# Patient Record
Sex: FEMALE | Race: OTHER | HISPANIC OR LATINO
[De-identification: names, ages, dates, MRNs, and addresses within clinical notes are randomized per-mention and may not be internally consistent; named-entity substitution may affect disease eponyms.]

---

## 2020-01-15 ENCOUNTER — APPOINTMENT (OUTPATIENT)
Dept: RHEUMATOLOGY | Facility: CLINIC | Age: 55
End: 2020-01-15
Payer: COMMERCIAL

## 2020-01-15 VITALS
WEIGHT: 150 LBS | SYSTOLIC BLOOD PRESSURE: 136 MMHG | OXYGEN SATURATION: 98 % | BODY MASS INDEX: 27.6 KG/M2 | RESPIRATION RATE: 17 BRPM | HEIGHT: 62 IN | HEART RATE: 85 BPM | DIASTOLIC BLOOD PRESSURE: 68 MMHG | TEMPERATURE: 99.3 F

## 2020-01-15 DIAGNOSIS — Z87.09 PERSONAL HISTORY OF OTHER DISEASES OF THE RESPIRATORY SYSTEM: ICD-10-CM

## 2020-01-15 DIAGNOSIS — Z78.9 OTHER SPECIFIED HEALTH STATUS: ICD-10-CM

## 2020-01-15 PROCEDURE — 99205 OFFICE O/P NEW HI 60 MIN: CPT

## 2020-01-15 NOTE — HISTORY OF PRESENT ILLNESS
[FreeTextEntry1] : 54 year old female with PMH as listed below presents today for an initial evaluation/ establish care for RA. \par \par Was previously following Dr. Ty for RA. Had symptoms of polyarthritis x 3 years. Treated with MTX- 10mg weekly. x 3 years with improvement in symptoms. Last seen by Dr. Ty 3 months ago . Last dose of MTX- 3 months ago.  Currently not taking any medications for pain. No longer following Dr. Ty as he does not accept her insurance. \par \par Today reports to have polyarthritis to BL hands. Denies significant swelling. Pain is dull/aching. Currently 5/10. Pain is constant, worse in the morning. Has morning stiffness 10-20 mins daily. \par \par denies fever, chills, weight loss, weight gain, fatigue, malaise, denies dry eye,eye pain, eye redness, vision changes, dry mouth, oral ulcers, nasal congestion, difficulty swallowing,  denies ear pain, hearing changes, denies chest pain, chest palpitations, denies sob, denies nausea, vomiting, abdominal pain, diarrhea, constipation, blood in stool, denies dysuria, blood in the urine, denies rashes, photosensitivity, hair loss, skin thickening, denies blood clots,  raynauds\par

## 2020-01-15 NOTE — ASSESSMENT
[FreeTextEntry1] : 54 year old female presents today to establish care for RA. Was dx 3 years ago. Previously following Dr. Ty. Treated with MTX- 10mg weekly x 3 years with improvement in symptoms.  Last dose of MTX- 3 months ago.  Currently not taking any medications for pain.Today with polyarthritis to BL hands. Will do further w/o as below for inflammatory arthritis/RA. \par \par - labs as below\par - xray of BL hands, BL feet, baseline CXR\par - prednisone 5mg daily until f/u (reviewed risk and benefits of medications)\par -Tylenol prn for pain (reviewed risk and benefits of medications)\par -OTC topical analgesics\par -will plan to start pt on DMARDS, likely MTX  after reviewing results\par \par Discussed treatment plan with the patient. The patient was given the opportunity to ask questions and all questions were answered to their satisfaction.\par \par

## 2020-01-15 NOTE — PHYSICAL EXAM
[General Appearance - Alert] : alert [General Appearance - In No Acute Distress] : in no acute distress [General Appearance - Well Nourished] : well nourished [General Appearance - Well Developed] : well developed [Sclera] : the sclera and conjunctiva were normal [PERRL With Normal Accommodation] : pupils were equal in size, round, and reactive to light [Outer Ear] : the ears and nose were normal in appearance [Examination Of The Oral Cavity] : the lips and gums were normal [Oropharynx] : the oropharynx was normal [Neck Appearance] : the appearance of the neck was normal [Heart Rate And Rhythm] : heart rate was normal and rhythm regular [Auscultation Breath Sounds / Voice Sounds] : lungs were clear to auscultation bilaterally [Heart Sounds] : normal S1 and S2 [Heart Sounds Gallop] : no gallops [Murmurs] : no murmurs [Heart Sounds Pericardial Friction Rub] : no pericardial rub [Bowel Sounds] : normal bowel sounds [Abdomen Soft] : soft [Abdomen Tenderness] : non-tender [] : no rash [Skin Lesions] : no skin lesions [No Focal Deficits] : no focal deficits [Oriented To Time, Place, And Person] : oriented to person, place, and time [No CVA Tenderness] : no ~M costovertebral angle tenderness [No Spinal Tenderness] : no spinal tenderness [Abnormal Walk] : normal gait [Nail Clubbing] : no clubbing  or cyanosis of the fingernails [Involuntary Movements] : no involuntary movements were seen [Musculoskeletal - Swelling] : no joint swelling seen [Motor Tone] : muscle strength and tone were normal [FreeTextEntry1] : +tenderness and puffiness to BL hands- PIPs, MCPS. No active synovitis

## 2020-01-21 ENCOUNTER — FORM ENCOUNTER (OUTPATIENT)
Age: 55
End: 2020-01-21

## 2020-01-21 LAB
25(OH)D3 SERPL-MCNC: 17 NG/ML
ALBUMIN MFR SERPL ELPH: 52.6 %
ALBUMIN SERPL ELPH-MCNC: 4.3 G/DL
ALBUMIN SERPL-MCNC: 4.1 G/DL
ALBUMIN/GLOB SERPL: 1.1 RATIO
ALP BLD-CCNC: 95 U/L
ALPHA1 GLOB MFR SERPL ELPH: 3.4 %
ALPHA1 GLOB SERPL ELPH-MCNC: 0.3 G/DL
ALPHA2 GLOB MFR SERPL ELPH: 9.4 %
ALPHA2 GLOB SERPL ELPH-MCNC: 0.7 G/DL
ALT SERPL-CCNC: 22 U/L
ANION GAP SERPL CALC-SCNC: 14 MMOL/L
AST SERPL-CCNC: 21 U/L
B-GLOBULIN MFR SERPL ELPH: 14.8 %
B-GLOBULIN SERPL ELPH-MCNC: 1.2 G/DL
BASOPHILS # BLD AUTO: 0.08 K/UL
BASOPHILS NFR BLD AUTO: 1 %
BILIRUB SERPL-MCNC: 0.3 MG/DL
BUN SERPL-MCNC: 8 MG/DL
CALCIUM SERPL-MCNC: 9.5 MG/DL
CCP AB SER IA-ACNC: >250 UNITS
CHLORIDE SERPL-SCNC: 104 MMOL/L
CK SERPL-CCNC: 223 U/L
CO2 SERPL-SCNC: 22 MMOL/L
CREAT SERPL-MCNC: 0.73 MG/DL
CREAT SPEC-SCNC: 86 MG/DL
CREAT/PROT UR: 0.1 RATIO
CRP SERPL-MCNC: 0.15 MG/DL
EOSINOPHIL # BLD AUTO: 0.5 K/UL
EOSINOPHIL NFR BLD AUTO: 6.5 %
ERYTHROCYTE [SEDIMENTATION RATE] IN BLOOD BY WESTERGREN METHOD: 102 MM/HR
GAMMA GLOB FLD ELPH-MCNC: 1.5 G/DL
GAMMA GLOB MFR SERPL ELPH: 19.8 %
GLUCOSE SERPL-MCNC: 89 MG/DL
HAV IGM SER QL: NONREACTIVE
HBV CORE IGG+IGM SER QL: NONREACTIVE
HBV CORE IGM SER QL: NONREACTIVE
HBV E AB SER QL: POSITIVE
HBV E AG SER QL: NEGATIVE
HBV SURFACE AB SER QL: NONREACTIVE
HBV SURFACE AG SER QL: NONREACTIVE
HCT VFR BLD CALC: 40.9 %
HCV AB SER QL: NONREACTIVE
HCV S/CO RATIO: 0.16 S/CO
HEPATITIS A IGG ANTIBODY: REACTIVE
HEPATITIS A IGG ANTIBODY: REACTIVE
HGB BLD-MCNC: 13.1 G/DL
IMM GRANULOCYTES NFR BLD AUTO: 0.1 %
INTERPRETATION SERPL IEP-IMP: NORMAL
LYMPHOCYTES # BLD AUTO: 2.66 K/UL
LYMPHOCYTES NFR BLD AUTO: 34.4 %
M PROTEIN MFR SERPL ELPH: NORMAL
M PROTEIN SPEC IFE-MCNC: NORMAL
M TB IFN-G BLD-IMP: POSITIVE
MAN DIFF?: NORMAL
MCHC RBC-ENTMCNC: 30.8 PG
MCHC RBC-ENTMCNC: 32 GM/DL
MCV RBC AUTO: 96 FL
MONOCLON BAND OBS SERPL: NORMAL
MONOCYTES # BLD AUTO: 0.47 K/UL
MONOCYTES NFR BLD AUTO: 6.1 %
NEUTROPHILS # BLD AUTO: 4.01 K/UL
NEUTROPHILS NFR BLD AUTO: 51.9 %
PLATELET # BLD AUTO: 264 K/UL
POTASSIUM SERPL-SCNC: 4 MMOL/L
PROT SERPL-MCNC: 7.8 G/DL
PROT UR-MCNC: 7 MG/DL
QUANTIFERON TB PLUS MITOGEN MINUS NIL: 8.23 IU/ML
QUANTIFERON TB PLUS NIL: 0.04 IU/ML
QUANTIFERON TB PLUS TB1 MINUS NIL: 0.46 IU/ML
QUANTIFERON TB PLUS TB2 MINUS NIL: 0.46 IU/ML
RBC # BLD: 4.26 M/UL
RBC # FLD: 12.5 %
RF+CCP IGG SER-IMP: ABNORMAL
RHEUMATOID FACT SER QL: >650 IU/ML
SODIUM SERPL-SCNC: 140 MMOL/L
TSH SERPL-ACNC: 1.74 UIU/ML
WBC # FLD AUTO: 7.73 K/UL

## 2020-01-22 ENCOUNTER — APPOINTMENT (OUTPATIENT)
Dept: RADIOLOGY | Facility: CLINIC | Age: 55
End: 2020-01-22
Payer: COMMERCIAL

## 2020-01-22 ENCOUNTER — OUTPATIENT (OUTPATIENT)
Dept: OUTPATIENT SERVICES | Facility: HOSPITAL | Age: 55
LOS: 1 days | End: 2020-01-22
Payer: COMMERCIAL

## 2020-01-22 DIAGNOSIS — Z87.39 PERSONAL HISTORY OF OTHER DISEASES OF THE MUSCULOSKELETAL SYSTEM AND CONNECTIVE TISSUE: ICD-10-CM

## 2020-01-22 PROCEDURE — 73630 X-RAY EXAM OF FOOT: CPT

## 2020-01-22 PROCEDURE — 71046 X-RAY EXAM CHEST 2 VIEWS: CPT | Mod: 26

## 2020-01-22 PROCEDURE — 73600 X-RAY EXAM OF ANKLE: CPT | Mod: 26,50

## 2020-01-22 PROCEDURE — 73130 X-RAY EXAM OF HAND: CPT | Mod: 26,50

## 2020-01-22 PROCEDURE — 73630 X-RAY EXAM OF FOOT: CPT | Mod: 26,50

## 2020-01-22 PROCEDURE — 73130 X-RAY EXAM OF HAND: CPT

## 2020-01-22 PROCEDURE — 71046 X-RAY EXAM CHEST 2 VIEWS: CPT

## 2020-01-22 PROCEDURE — 73600 X-RAY EXAM OF ANKLE: CPT

## 2020-01-31 ENCOUNTER — APPOINTMENT (OUTPATIENT)
Dept: RHEUMATOLOGY | Facility: CLINIC | Age: 55
End: 2020-01-31
Payer: COMMERCIAL

## 2020-01-31 VITALS
HEART RATE: 86 BPM | SYSTOLIC BLOOD PRESSURE: 110 MMHG | OXYGEN SATURATION: 98 % | HEIGHT: 62 IN | TEMPERATURE: 98.7 F | BODY MASS INDEX: 28.52 KG/M2 | WEIGHT: 155 LBS | RESPIRATION RATE: 17 BRPM | DIASTOLIC BLOOD PRESSURE: 60 MMHG

## 2020-01-31 DIAGNOSIS — R76.12 NONSPECIFIC REACTION TO CELL MEDIATED IMMUNITY MEASUREMENT OF GAMMA INTERFERON ANTIGEN RESPONSE W/OUT ACTIVE TUBERCULOSIS: ICD-10-CM

## 2020-01-31 PROCEDURE — 99215 OFFICE O/P EST HI 40 MIN: CPT

## 2020-01-31 NOTE — HISTORY OF PRESENT ILLNESS
[FreeTextEntry1] : Pt presenting today for a f.u visit. Now asymptomatic. s/p trail with prednisone 5mg daily with significant improvement in symptoms. Has morning stiffness- 30 mins daily. Denies pain/swelling to any joint. \par Labs and imaging reviewed with pt. Found to have elevated inflammatory markers, RF, CCP. +Quantiferon, +Hep Be AB, vitamin d: 17. \par Xray BL hands/ BL ankles/feet: unremarkable\par baseline CXR: unremarkable

## 2020-01-31 NOTE — PHYSICAL EXAM
[General Appearance - Alert] : alert [General Appearance - In No Acute Distress] : in no acute distress [General Appearance - Well Developed] : well developed [General Appearance - Well Nourished] : well nourished [Sclera] : the sclera and conjunctiva were normal [Outer Ear] : the ears and nose were normal in appearance [Examination Of The Oral Cavity] : the lips and gums were normal [PERRL With Normal Accommodation] : pupils were equal in size, round, and reactive to light [Neck Appearance] : the appearance of the neck was normal [Oropharynx] : the oropharynx was normal [Auscultation Breath Sounds / Voice Sounds] : lungs were clear to auscultation bilaterally [Heart Sounds] : normal S1 and S2 [Heart Rate And Rhythm] : heart rate was normal and rhythm regular [Heart Sounds Gallop] : no gallops [Murmurs] : no murmurs [Heart Sounds Pericardial Friction Rub] : no pericardial rub [Abdomen Soft] : soft [Bowel Sounds] : normal bowel sounds [Abdomen Tenderness] : non-tender [No CVA Tenderness] : no ~M costovertebral angle tenderness [No Spinal Tenderness] : no spinal tenderness [Abnormal Walk] : normal gait [Nail Clubbing] : no clubbing  or cyanosis of the fingernails [Motor Tone] : muscle strength and tone were normal [Involuntary Movements] : no involuntary movements were seen [Musculoskeletal - Swelling] : no joint swelling seen [] : no rash [Skin Lesions] : no skin lesions [No Focal Deficits] : no focal deficits [Oriented To Time, Place, And Person] : oriented to person, place, and time [FreeTextEntry1] : +mild tenderness and puffiness to BL hands- PIPs, MCPS- improved from prior

## 2020-01-31 NOTE — ASSESSMENT
[FreeTextEntry1] : 54 year old female presents today for f.u visit for RA. Has hx of polyarthritis to BL hands. Previously following Dr. Ty. Treated with MTX- 10mg weekly x 3 years with improvement in symptoms.  Last dose of MTX- 3 months ago.  Now s/p trail with prednisone 5mg daily with significant improvement in symptoms. \par Labs with elevated inflammatory markers, RF, CCP. +Quantiferon, +Hep Be AB, vitamin d: 17. Xray BL hands/ BL ankles/feet: unremarkable. baseline CXR: unremarkable\par \par - c/w prednisone 5mg daily (reviewed risk and benefits of medications)\par - start SSZ 500mg BID (reviewed risk and benefits of medications), pt reluctant to retry MTX at this time\par -Tylenol prn for pain (reviewed risk and benefits of medications)\par -OTC topical analgesics\par -ID evaluation for latent TB\par - start vitamin d 50,000IU weekly\par \par Discussed treatment plan with the patient. The patient was given the opportunity to ask questions and all questions were answered to their satisfaction.

## 2020-06-02 ENCOUNTER — APPOINTMENT (OUTPATIENT)
Dept: RHEUMATOLOGY | Facility: CLINIC | Age: 55
End: 2020-06-02
Payer: COMMERCIAL

## 2020-06-02 PROCEDURE — 99215 OFFICE O/P EST HI 40 MIN: CPT | Mod: 95

## 2020-06-05 NOTE — ASSESSMENT
[FreeTextEntry1] : 55 year old female evaluated today via telehealth ([facility]). Has hx of RA/polyarthritis to BL hands. Previously following Dr. Ty. Treated with MTX- 10mg weekly x 3 years with improvement in symptoms.  Last dose of MTX- 3 months ago.  Now s/p trail with prednisone 5mg daily, SSZ 500mg BID with significant improvement in symptoms. \par Prior labs with elevated inflammatory markers, RF, CCP. +Quantiferon, +Hep Be AB, vitamin d: 17. Xray BL hands/ BL ankles/feet: unremarkable. baseline CXR: unremarkable\par \par - taper off prednisone\par - increase SSZ to 1000mg BID (reviewed risk and benefits of medications), pt reluctant to retry MTX at this time\par -Tylenol prn for pain (reviewed risk and benefits of medications)\par -OTC topical analgesics\par -ID evaluation for latent TB\par - repeat labs\par \par Discussed treatment plan with the patient. The patient was given the opportunity to ask questions and all questions were answered to their satisfaction.

## 2020-06-05 NOTE — HISTORY OF PRESENT ILLNESS
[Home] : at home, [unfilled] , at the time of the visit. [Medical Office: ([facility])___] : at the medical office located in  [Verbal consent obtained from patient] : the patient, [unfilled] [FreeTextEntry1] : Pt evaluated today via telehealth ([facility]). Doing well overall- today asymptomatic.  Denies pain/swelling/stiffness to any joint. Currently on prednisone 5mg daily, SSZ 500mg BID. Tolerating medications well. Denies side effects. \par Denies fever, chills, CP, SOB, abd pain, rashes.

## 2020-11-06 ENCOUNTER — LABORATORY RESULT (OUTPATIENT)
Age: 55
End: 2020-11-06

## 2020-11-06 ENCOUNTER — APPOINTMENT (OUTPATIENT)
Dept: RHEUMATOLOGY | Facility: CLINIC | Age: 55
End: 2020-11-06
Payer: COMMERCIAL

## 2020-11-06 VITALS
RESPIRATION RATE: 17 BRPM | TEMPERATURE: 97.9 F | DIASTOLIC BLOOD PRESSURE: 82 MMHG | SYSTOLIC BLOOD PRESSURE: 134 MMHG | HEIGHT: 62 IN

## 2020-11-06 DIAGNOSIS — M25.649 STIFFNESS OF UNSPECIFIED HAND, NOT ELSEWHERE CLASSIFIED: ICD-10-CM

## 2020-11-06 DIAGNOSIS — E55.9 VITAMIN D DEFICIENCY, UNSPECIFIED: ICD-10-CM

## 2020-11-06 PROCEDURE — 99072 ADDL SUPL MATRL&STAF TM PHE: CPT

## 2020-11-06 PROCEDURE — 36415 COLL VENOUS BLD VENIPUNCTURE: CPT

## 2020-11-06 PROCEDURE — 99214 OFFICE O/P EST MOD 30 MIN: CPT | Mod: 25

## 2020-11-06 NOTE — PHYSICAL EXAM
[General Appearance - Alert] : alert [General Appearance - In No Acute Distress] : in no acute distress [General Appearance - Well Nourished] : well nourished [General Appearance - Well Developed] : well developed [Sclera] : the sclera and conjunctiva were normal [PERRL With Normal Accommodation] : pupils were equal in size, round, and reactive to light [Extraocular Movements] : extraocular movements were intact [Outer Ear] : the ears and nose were normal in appearance [Examination Of The Oral Cavity] : the lips and gums were normal [Oropharynx] : the oropharynx was normal [Neck Appearance] : the appearance of the neck was normal [Neck Cervical Mass (___cm)] : no neck mass was observed [Jugular Venous Distention Increased] : there was no jugular-venous distention [Auscultation Breath Sounds / Voice Sounds] : lungs were clear to auscultation bilaterally [Heart Rate And Rhythm] : heart rate was normal and rhythm regular [Heart Sounds] : normal S1 and S2 [Heart Sounds Gallop] : no gallops [Murmurs] : no murmurs [Heart Sounds Pericardial Friction Rub] : no pericardial rub [Bowel Sounds] : normal bowel sounds [Abdomen Soft] : soft [Abdomen Tenderness] : non-tender [Abdomen Mass (___ Cm)] : no abdominal mass palpated [No CVA Tenderness] : no ~M costovertebral angle tenderness [No Spinal Tenderness] : no spinal tenderness [Abnormal Walk] : normal gait [Nail Clubbing] : no clubbing  or cyanosis of the fingernails [Involuntary Movements] : no involuntary movements were seen [Musculoskeletal - Swelling] : no joint swelling seen [Motor Tone] : muscle strength and tone were normal [] : no rash [Skin Lesions] : no skin lesions [Sensation] : the sensory exam was normal to light touch and pinprick [Motor Exam] : the motor exam was normal [No Focal Deficits] : no focal deficits [Oriented To Time, Place, And Person] : oriented to person, place, and time [Impaired Insight] : insight and judgment were intact [Affect] : the affect was normal [Mood] : the mood was normal

## 2020-11-09 NOTE — HISTORY OF PRESENT ILLNESS
[FreeTextEntry1] : Pt presenting today for a f.u visit. Doing well overall- today asymptomatic.  Denies pain/swelling/stiffness to any joint. Currently SSZ on 1000mg BID. Tolerating medications well. Denies side effects. \par Denies fever, chills, CP, SOB, abd pain, rashes.

## 2020-11-09 NOTE — REASON FOR VISIT
[Follow-Up: _____] : a [unfilled] follow-up visit [FreeTextEntry1] : [id number] [FreeTextEntry2] : Elen [TWNoteComboBox1] : [place]

## 2020-11-09 NOTE — ASSESSMENT
[FreeTextEntry1] : 55 year old female presenting today for a f.u visit. Has hx of RA/polyarthritis to BL hands. Previously following Dr. Ty. Treated with MTX- 10mg weekly x 3 years with improvement in symptoms.  Last dose of MTX- 3 months ago.  Now on SSZ 1000mg BID with significant improvement in symptoms. Prior labs with elevated inflammatory markers, RF, CCP. +Quantiferon, +Hep Be AB, vitamin d: 17. Xray BL hands/ BL ankles/feet: unremarkable. baseline CXR: unremarkable\par \par - c/w SSZ to 1000mg BID (reviewed risk and benefits of medications)\par - Tylenol prn for pain (reviewed risk and benefits of medications)\par - OTC topical analgesics\par - ID evaluation for latent TB\par - repeat labs today\par \par Discussed treatment plan with the patient. The patient was given the opportunity to ask questions and all questions were answered to their satisfaction.

## 2020-12-11 LAB
25(OH)D3 SERPL-MCNC: 22.6 NG/ML
ALBUMIN MFR SERPL ELPH: 51.3 %
ALBUMIN SERPL ELPH-MCNC: 4.5 G/DL
ALBUMIN SERPL-MCNC: 3.8 G/DL
ALBUMIN/GLOB SERPL: 1 RATIO
ALBUPE: 16.9 %
ALP BLD-CCNC: 93 U/L
ALPHA1 GLOB MFR SERPL ELPH: 3.3 %
ALPHA1 GLOB SERPL ELPH-MCNC: 0.2 G/DL
ALPHA1UPE: 32.9 %
ALPHA2 GLOB MFR SERPL ELPH: 9.5 %
ALPHA2 GLOB SERPL ELPH-MCNC: 0.7 G/DL
ALPHA2UPE: 24.2 %
ALT SERPL-CCNC: 18 U/L
ANA PAT FLD IF-IMP: ABNORMAL
ANA SER IF-ACNC: ABNORMAL
ANION GAP SERPL CALC-SCNC: 11 MMOL/L
AST SERPL-CCNC: 20 U/L
B-GLOBULIN MFR SERPL ELPH: 14.9 %
B-GLOBULIN SERPL ELPH-MCNC: 1.1 G/DL
BASOPHILS # BLD AUTO: 0.07 K/UL
BASOPHILS NFR BLD AUTO: 0.8 %
BETAUPE: 9.7 %
BILIRUB SERPL-MCNC: <0.2 MG/DL
BUN SERPL-MCNC: 14 MG/DL
CALCIUM SERPL-MCNC: 9.9 MG/DL
CCP AB SER IA-ACNC: >250 UNITS
CHLORIDE SERPL-SCNC: 103 MMOL/L
CK SERPL-CCNC: 194 U/L
CO2 SERPL-SCNC: 27 MMOL/L
CREAT 24H UR-MCNC: NORMAL G/24 H
CREAT SERPL-MCNC: 0.73 MG/DL
CREAT SPEC-SCNC: 60 MG/DL
CREAT/PROT UR: 0.2 RATIO
CREATININE UR (MAYO): 54 MG/DL
CRP SERPL-MCNC: 0.17 MG/DL
EOSINOPHIL # BLD AUTO: 0.63 K/UL
EOSINOPHIL NFR BLD AUTO: 6.9 %
ERYTHROCYTE [SEDIMENTATION RATE] IN BLOOD BY WESTERGREN METHOD: 75 MM/HR
GAMMA GLOB FLD ELPH-MCNC: 1.6 G/DL
GAMMA GLOB MFR SERPL ELPH: 21 %
GAMMAUPE: 16.3 %
GLUCOSE SERPL-MCNC: 87 MG/DL
HCT VFR BLD CALC: 40.6 %
HGB BLD-MCNC: 12.5 G/DL
IGA 24H UR QL IFE: NORMAL
IMM GRANULOCYTES NFR BLD AUTO: 0.2 %
INTERPRETATION SERPL IEP-IMP: NORMAL
KAPPA LC 24H UR QL: NORMAL
LYMPHOCYTES # BLD AUTO: 2.96 K/UL
LYMPHOCYTES NFR BLD AUTO: 32.4 %
M PROTEIN SPEC IFE-MCNC: NORMAL
MAN DIFF?: NORMAL
MCHC RBC-ENTMCNC: 30.6 PG
MCHC RBC-ENTMCNC: 30.8 GM/DL
MCV RBC AUTO: 99.5 FL
MONOCYTES # BLD AUTO: 0.7 K/UL
MONOCYTES NFR BLD AUTO: 7.7 %
NEUTROPHILS # BLD AUTO: 4.75 K/UL
NEUTROPHILS NFR BLD AUTO: 52 %
PLATELET # BLD AUTO: 255 K/UL
POTASSIUM SERPL-SCNC: 3.8 MMOL/L
PROT PATTERN 24H UR ELPH-IMP: NORMAL
PROT SERPL-MCNC: 7.5 G/DL
PROT SERPL-MCNC: 7.5 G/DL
PROT SERPL-MCNC: 7.6 G/DL
PROT UR-MCNC: 12 MG/DL
RBC # BLD: 4.08 M/UL
RBC # FLD: 12.6 %
RF+CCP IGG SER-IMP: ABNORMAL
RHEUMATOID FACT SER QL: >650 IU/ML
SODIUM SERPL-SCNC: 141 MMOL/L
SPECIMEN VOL 24H UR: NORMAL ML
TSH SERPL-ACNC: 2.09 UIU/ML
WBC # FLD AUTO: 9.13 K/UL

## 2020-12-23 ENCOUNTER — INPATIENT (INPATIENT)
Facility: HOSPITAL | Age: 55
LOS: 4 days | Discharge: HOME | DRG: 177 | End: 2020-12-28
Attending: STUDENT IN AN ORGANIZED HEALTH CARE EDUCATION/TRAINING PROGRAM | Admitting: HOSPITALIST
Payer: COMMERCIAL

## 2020-12-23 ENCOUNTER — INPATIENT (INPATIENT)
Facility: HOSPITAL | Age: 55
LOS: 0 days | Discharge: ANOTHER HOSPITAL | DRG: 177 | End: 2020-12-23
Attending: FAMILY MEDICINE | Admitting: FAMILY MEDICINE
Payer: COMMERCIAL

## 2020-12-23 ENCOUNTER — TRANSCRIPTION ENCOUNTER (OUTPATIENT)
Age: 55
End: 2020-12-23

## 2020-12-23 VITALS
SYSTOLIC BLOOD PRESSURE: 127 MMHG | TEMPERATURE: 101 F | RESPIRATION RATE: 22 BRPM | DIASTOLIC BLOOD PRESSURE: 69 MMHG | HEART RATE: 110 BPM | OXYGEN SATURATION: 92 %

## 2020-12-23 VITALS
OXYGEN SATURATION: 96 % | DIASTOLIC BLOOD PRESSURE: 53 MMHG | HEART RATE: 105 BPM | TEMPERATURE: 99 F | SYSTOLIC BLOOD PRESSURE: 114 MMHG | RESPIRATION RATE: 20 BRPM

## 2020-12-23 VITALS
RESPIRATION RATE: 20 BRPM | OXYGEN SATURATION: 98 % | SYSTOLIC BLOOD PRESSURE: 125 MMHG | HEART RATE: 126 BPM | DIASTOLIC BLOOD PRESSURE: 70 MMHG | TEMPERATURE: 103 F

## 2020-12-23 DIAGNOSIS — U07.0 VAPING-RELATED DISORDER: ICD-10-CM

## 2020-12-23 DIAGNOSIS — R06.02 SHORTNESS OF BREATH: ICD-10-CM

## 2020-12-23 DIAGNOSIS — U07.1 COVID-19: ICD-10-CM

## 2020-12-23 DIAGNOSIS — R50.9 FEVER, UNSPECIFIED: ICD-10-CM

## 2020-12-23 DIAGNOSIS — Z98.891 HISTORY OF UTERINE SCAR FROM PREVIOUS SURGERY: Chronic | ICD-10-CM

## 2020-12-23 DIAGNOSIS — J45.909 UNSPECIFIED ASTHMA, UNCOMPLICATED: ICD-10-CM

## 2020-12-23 LAB
ALBUMIN SERPL ELPH-MCNC: 3.2 G/DL — LOW (ref 3.3–5.2)
ALP SERPL-CCNC: 87 U/L — SIGNIFICANT CHANGE UP (ref 40–120)
ALT FLD-CCNC: 96 U/L — HIGH
ANION GAP SERPL CALC-SCNC: 13 MMOL/L — SIGNIFICANT CHANGE UP (ref 5–17)
AST SERPL-CCNC: 101 U/L — HIGH
BASOPHILS # BLD AUTO: 0.02 K/UL — SIGNIFICANT CHANGE UP (ref 0–0.2)
BASOPHILS NFR BLD AUTO: 0.2 % — SIGNIFICANT CHANGE UP (ref 0–2)
BILIRUB SERPL-MCNC: 0.6 MG/DL — SIGNIFICANT CHANGE UP (ref 0.4–2)
BUN SERPL-MCNC: 9 MG/DL — SIGNIFICANT CHANGE UP (ref 8–20)
CALCIUM SERPL-MCNC: 8.5 MG/DL — LOW (ref 8.6–10.2)
CHLORIDE SERPL-SCNC: 103 MMOL/L — SIGNIFICANT CHANGE UP (ref 98–107)
CO2 SERPL-SCNC: 23 MMOL/L — SIGNIFICANT CHANGE UP (ref 22–29)
CREAT SERPL-MCNC: 0.68 MG/DL — SIGNIFICANT CHANGE UP (ref 0.5–1.3)
CRP SERPL-MCNC: 9.87 MG/DL — HIGH (ref 0–0.4)
D DIMER BLD IA.RAPID-MCNC: 2096 NG/ML DDU — HIGH
EOSINOPHIL # BLD AUTO: 0.01 K/UL — SIGNIFICANT CHANGE UP (ref 0–0.5)
EOSINOPHIL NFR BLD AUTO: 0.1 % — SIGNIFICANT CHANGE UP (ref 0–6)
FERRITIN SERPL-MCNC: 666 NG/ML — HIGH (ref 15–150)
FIBRINOGEN PPP-MCNC: 874 MG/DL — CRITICAL HIGH (ref 290–520)
FLU A RESULT: SIGNIFICANT CHANGE UP
FLU A RESULT: SIGNIFICANT CHANGE UP
FLUAV AG NPH QL: SIGNIFICANT CHANGE UP
FLUBV AG NPH QL: SIGNIFICANT CHANGE UP
GLUCOSE SERPL-MCNC: 97 MG/DL — SIGNIFICANT CHANGE UP (ref 70–99)
HCG UR QL: NEGATIVE — SIGNIFICANT CHANGE UP
HCT VFR BLD CALC: 36.8 % — SIGNIFICANT CHANGE UP (ref 34.5–45)
HGB BLD-MCNC: 12.3 G/DL — SIGNIFICANT CHANGE UP (ref 11.5–15.5)
IMM GRANULOCYTES NFR BLD AUTO: 0.5 % — SIGNIFICANT CHANGE UP (ref 0–1.5)
LYMPHOCYTES # BLD AUTO: 1.63 K/UL — SIGNIFICANT CHANGE UP (ref 1–3.3)
LYMPHOCYTES # BLD AUTO: 15.9 % — SIGNIFICANT CHANGE UP (ref 13–44)
MCHC RBC-ENTMCNC: 30.9 PG — SIGNIFICANT CHANGE UP (ref 27–34)
MCHC RBC-ENTMCNC: 33.4 GM/DL — SIGNIFICANT CHANGE UP (ref 32–36)
MCV RBC AUTO: 92.5 FL — SIGNIFICANT CHANGE UP (ref 80–100)
MONOCYTES # BLD AUTO: 0.38 K/UL — SIGNIFICANT CHANGE UP (ref 0–0.9)
MONOCYTES NFR BLD AUTO: 3.7 % — SIGNIFICANT CHANGE UP (ref 2–14)
NEUTROPHILS # BLD AUTO: 8.18 K/UL — HIGH (ref 1.8–7.4)
NEUTROPHILS NFR BLD AUTO: 79.6 % — HIGH (ref 43–77)
PLATELET # BLD AUTO: 256 K/UL — SIGNIFICANT CHANGE UP (ref 150–400)
POTASSIUM SERPL-MCNC: 3.6 MMOL/L — SIGNIFICANT CHANGE UP (ref 3.5–5.3)
POTASSIUM SERPL-SCNC: 3.6 MMOL/L — SIGNIFICANT CHANGE UP (ref 3.5–5.3)
PROCALCITONIN SERPL-MCNC: 0.09 NG/ML — SIGNIFICANT CHANGE UP (ref 0.02–0.1)
PROT SERPL-MCNC: 7.8 G/DL — SIGNIFICANT CHANGE UP (ref 6.6–8.7)
RBC # BLD: 3.98 M/UL — SIGNIFICANT CHANGE UP (ref 3.8–5.2)
RBC # FLD: 12.2 % — SIGNIFICANT CHANGE UP (ref 10.3–14.5)
RSV RESULT: SIGNIFICANT CHANGE UP
RSV RNA RESP QL NAA+PROBE: SIGNIFICANT CHANGE UP
SARS-COV-2 RNA SPEC QL NAA+PROBE: DETECTED
SODIUM SERPL-SCNC: 139 MMOL/L — SIGNIFICANT CHANGE UP (ref 135–145)
WBC # BLD: 10.27 K/UL — SIGNIFICANT CHANGE UP (ref 3.8–10.5)
WBC # FLD AUTO: 10.27 K/UL — SIGNIFICANT CHANGE UP (ref 3.8–10.5)

## 2020-12-23 PROCEDURE — 93005 ELECTROCARDIOGRAM TRACING: CPT

## 2020-12-23 PROCEDURE — 87631 RESP VIRUS 3-5 TARGETS: CPT

## 2020-12-23 PROCEDURE — 96374 THER/PROPH/DIAG INJ IV PUSH: CPT

## 2020-12-23 PROCEDURE — 84145 PROCALCITONIN (PCT): CPT

## 2020-12-23 PROCEDURE — 82728 ASSAY OF FERRITIN: CPT

## 2020-12-23 PROCEDURE — 71275 CT ANGIOGRAPHY CHEST: CPT | Mod: 26

## 2020-12-23 PROCEDURE — 81025 URINE PREGNANCY TEST: CPT

## 2020-12-23 PROCEDURE — 71275 CT ANGIOGRAPHY CHEST: CPT

## 2020-12-23 PROCEDURE — 71045 X-RAY EXAM CHEST 1 VIEW: CPT | Mod: 26

## 2020-12-23 PROCEDURE — U0003: CPT

## 2020-12-23 PROCEDURE — 71045 X-RAY EXAM CHEST 1 VIEW: CPT

## 2020-12-23 PROCEDURE — 12345: CPT | Mod: NC

## 2020-12-23 PROCEDURE — 85384 FIBRINOGEN ACTIVITY: CPT

## 2020-12-23 PROCEDURE — 99236 HOSP IP/OBS SAME DATE HI 85: CPT

## 2020-12-23 PROCEDURE — 86140 C-REACTIVE PROTEIN: CPT

## 2020-12-23 PROCEDURE — 99285 EMERGENCY DEPT VISIT HI MDM: CPT | Mod: 25

## 2020-12-23 PROCEDURE — T1013: CPT

## 2020-12-23 PROCEDURE — 99285 EMERGENCY DEPT VISIT HI MDM: CPT

## 2020-12-23 PROCEDURE — 85379 FIBRIN DEGRADATION QUANT: CPT

## 2020-12-23 PROCEDURE — 36415 COLL VENOUS BLD VENIPUNCTURE: CPT

## 2020-12-23 PROCEDURE — 99223 1ST HOSP IP/OBS HIGH 75: CPT

## 2020-12-23 PROCEDURE — 85025 COMPLETE CBC W/AUTO DIFF WBC: CPT

## 2020-12-23 PROCEDURE — 93010 ELECTROCARDIOGRAM REPORT: CPT

## 2020-12-23 PROCEDURE — 80053 COMPREHEN METABOLIC PANEL: CPT

## 2020-12-23 RX ORDER — ACETAMINOPHEN 500 MG
650 TABLET ORAL EVERY 6 HOURS
Refills: 0 | Status: DISCONTINUED | OUTPATIENT
Start: 2020-12-23 | End: 2020-12-23

## 2020-12-23 RX ORDER — DEXAMETHASONE 0.5 MG/5ML
6 ELIXIR ORAL ONCE
Refills: 0 | Status: COMPLETED | OUTPATIENT
Start: 2020-12-23 | End: 2020-12-23

## 2020-12-23 RX ORDER — DEXAMETHASONE 0.5 MG/5ML
6 ELIXIR ORAL DAILY
Refills: 0 | Status: DISCONTINUED | OUTPATIENT
Start: 2020-12-23 | End: 2020-12-28

## 2020-12-23 RX ORDER — PANTOPRAZOLE SODIUM 20 MG/1
1 TABLET, DELAYED RELEASE ORAL
Qty: 0 | Refills: 0 | DISCHARGE
Start: 2020-12-23

## 2020-12-23 RX ORDER — ASCORBIC ACID 60 MG
500 TABLET,CHEWABLE ORAL DAILY
Refills: 0 | Status: DISCONTINUED | OUTPATIENT
Start: 2020-12-23 | End: 2020-12-28

## 2020-12-23 RX ORDER — ASCORBIC ACID 60 MG
1 TABLET,CHEWABLE ORAL
Qty: 0 | Refills: 0 | DISCHARGE
Start: 2020-12-23

## 2020-12-23 RX ORDER — ALBUTEROL 90 UG/1
2 AEROSOL, METERED ORAL EVERY 6 HOURS
Refills: 0 | Status: DISCONTINUED | OUTPATIENT
Start: 2020-12-23 | End: 2020-12-28

## 2020-12-23 RX ORDER — ASCORBIC ACID 60 MG
500 TABLET,CHEWABLE ORAL DAILY
Refills: 0 | Status: DISCONTINUED | OUTPATIENT
Start: 2020-12-23 | End: 2020-12-23

## 2020-12-23 RX ORDER — ONDANSETRON 8 MG/1
4 TABLET, FILM COATED ORAL EVERY 6 HOURS
Refills: 0 | Status: DISCONTINUED | OUTPATIENT
Start: 2020-12-23 | End: 2020-12-28

## 2020-12-23 RX ORDER — BENZOCAINE AND MENTHOL 5; 1 G/100ML; G/100ML
1 LIQUID ORAL EVERY 4 HOURS
Refills: 0 | Status: DISCONTINUED | OUTPATIENT
Start: 2020-12-23 | End: 2020-12-23

## 2020-12-23 RX ORDER — PANTOPRAZOLE SODIUM 20 MG/1
40 TABLET, DELAYED RELEASE ORAL DAILY
Refills: 0 | Status: DISCONTINUED | OUTPATIENT
Start: 2020-12-23 | End: 2020-12-28

## 2020-12-23 RX ORDER — CHOLECALCIFEROL (VITAMIN D3) 125 MCG
1000 CAPSULE ORAL
Qty: 0 | Refills: 0 | DISCHARGE
Start: 2020-12-23

## 2020-12-23 RX ORDER — ALBUTEROL 90 UG/1
2 AEROSOL, METERED ORAL EVERY 6 HOURS
Refills: 0 | Status: DISCONTINUED | OUTPATIENT
Start: 2020-12-23 | End: 2020-12-23

## 2020-12-23 RX ORDER — ZINC SULFATE TAB 220 MG (50 MG ZINC EQUIVALENT) 220 (50 ZN) MG
220 TAB ORAL DAILY
Refills: 0 | Status: COMPLETED | OUTPATIENT
Start: 2020-12-23 | End: 2020-12-28

## 2020-12-23 RX ORDER — REMDESIVIR 5 MG/ML
200 INJECTION INTRAVENOUS EVERY 24 HOURS
Refills: 0 | Status: COMPLETED | OUTPATIENT
Start: 2020-12-23 | End: 2020-12-23

## 2020-12-23 RX ORDER — ACETAMINOPHEN 500 MG
650 TABLET ORAL ONCE
Refills: 0 | Status: COMPLETED | OUTPATIENT
Start: 2020-12-23 | End: 2020-12-23

## 2020-12-23 RX ORDER — ACETAMINOPHEN 500 MG
650 TABLET ORAL EVERY 6 HOURS
Refills: 0 | Status: DISCONTINUED | OUTPATIENT
Start: 2020-12-23 | End: 2020-12-28

## 2020-12-23 RX ORDER — PANTOPRAZOLE SODIUM 20 MG/1
40 TABLET, DELAYED RELEASE ORAL DAILY
Refills: 0 | Status: DISCONTINUED | OUTPATIENT
Start: 2020-12-23 | End: 2020-12-23

## 2020-12-23 RX ORDER — ENOXAPARIN SODIUM 100 MG/ML
40 INJECTION SUBCUTANEOUS DAILY
Refills: 0 | Status: DISCONTINUED | OUTPATIENT
Start: 2020-12-23 | End: 2020-12-28

## 2020-12-23 RX ORDER — REMDESIVIR 5 MG/ML
INJECTION INTRAVENOUS
Refills: 0 | Status: COMPLETED | OUTPATIENT
Start: 2020-12-23 | End: 2020-12-27

## 2020-12-23 RX ORDER — CHOLECALCIFEROL (VITAMIN D3) 125 MCG
1000 CAPSULE ORAL DAILY
Refills: 0 | Status: DISCONTINUED | OUTPATIENT
Start: 2020-12-23 | End: 2020-12-23

## 2020-12-23 RX ORDER — REMDESIVIR 5 MG/ML
100 INJECTION INTRAVENOUS EVERY 24 HOURS
Refills: 0 | Status: COMPLETED | OUTPATIENT
Start: 2020-12-24 | End: 2020-12-27

## 2020-12-23 RX ORDER — FLUTICASONE PROPIONATE AND SALMETEROL 50; 250 UG/1; UG/1
1 POWDER ORAL; RESPIRATORY (INHALATION)
Qty: 0 | Refills: 0 | DISCHARGE

## 2020-12-23 RX ORDER — BENZOCAINE AND MENTHOL 5; 1 G/100ML; G/100ML
1 LIQUID ORAL EVERY 4 HOURS
Refills: 0 | Status: DISCONTINUED | OUTPATIENT
Start: 2020-12-23 | End: 2020-12-28

## 2020-12-23 RX ORDER — ENOXAPARIN SODIUM 100 MG/ML
40 INJECTION SUBCUTANEOUS DAILY
Refills: 0 | Status: DISCONTINUED | OUTPATIENT
Start: 2020-12-23 | End: 2020-12-23

## 2020-12-23 RX ORDER — DEXAMETHASONE 0.5 MG/5ML
6 ELIXIR ORAL DAILY
Refills: 0 | Status: DISCONTINUED | OUTPATIENT
Start: 2020-12-24 | End: 2020-12-23

## 2020-12-23 RX ORDER — ACETAMINOPHEN 500 MG
2 TABLET ORAL
Qty: 0 | Refills: 0 | DISCHARGE
Start: 2020-12-23

## 2020-12-23 RX ORDER — CHOLECALCIFEROL (VITAMIN D3) 125 MCG
1000 CAPSULE ORAL DAILY
Refills: 0 | Status: DISCONTINUED | OUTPATIENT
Start: 2020-12-23 | End: 2020-12-28

## 2020-12-23 RX ORDER — ONDANSETRON 8 MG/1
4 TABLET, FILM COATED ORAL EVERY 6 HOURS
Refills: 0 | Status: DISCONTINUED | OUTPATIENT
Start: 2020-12-23 | End: 2020-12-23

## 2020-12-23 RX ORDER — ZINC SULFATE TAB 220 MG (50 MG ZINC EQUIVALENT) 220 (50 ZN) MG
220 TAB ORAL DAILY
Refills: 0 | Status: DISCONTINUED | OUTPATIENT
Start: 2020-12-23 | End: 2020-12-23

## 2020-12-23 RX ADMIN — Medication 650 MILLIGRAM(S): at 23:53

## 2020-12-23 RX ADMIN — Medication 650 MILLIGRAM(S): at 07:53

## 2020-12-23 RX ADMIN — ZINC SULFATE TAB 220 MG (50 MG ZINC EQUIVALENT) 220 MILLIGRAM(S): 220 (50 ZN) TAB at 13:07

## 2020-12-23 RX ADMIN — Medication 650 MILLIGRAM(S): at 06:44

## 2020-12-23 RX ADMIN — Medication 650 MILLIGRAM(S): at 21:48

## 2020-12-23 RX ADMIN — Medication 6 MILLIGRAM(S): at 21:48

## 2020-12-23 RX ADMIN — Medication 500 MILLIGRAM(S): at 13:07

## 2020-12-23 RX ADMIN — Medication 6 MILLIGRAM(S): at 07:42

## 2020-12-23 RX ADMIN — REMDESIVIR 500 MILLIGRAM(S): 5 INJECTION INTRAVENOUS at 21:52

## 2020-12-23 RX ADMIN — ENOXAPARIN SODIUM 40 MILLIGRAM(S): 100 INJECTION SUBCUTANEOUS at 13:09

## 2020-12-23 RX ADMIN — Medication 1000 UNIT(S): at 13:07

## 2020-12-23 RX ADMIN — PANTOPRAZOLE SODIUM 40 MILLIGRAM(S): 20 TABLET, DELAYED RELEASE ORAL at 13:07

## 2020-12-23 NOTE — ED ADULT TRIAGE NOTE - BP NONINVASIVE DIASTOLIC (MM HG)
Dewayne Suarez MD, saw and evaluated the patient  I have discussed the patient with the resident/non-physician practitioner and agree with the resident's/non-physician practitioner's findings, Plan of Care, and MDM as documented in the resident's/non-physician practitioner's note, except where noted  All available labs and Radiology studies were reviewed  At this point I agree with the current assessment done in the Emergency Department    I have conducted an independent evaluation of this patient a history and physical is as follows:      Critical Care Time  CritCare Time    Procedures
70

## 2020-12-23 NOTE — H&P ADULT - NSHPPHYSICALEXAM_GEN_ALL_CORE
Vital Signs   T(C): 38.3 (23 Dec 2020 07:43), Max: 39.4 (23 Dec 2020 06:23)  T(F): 100.9 (23 Dec 2020 07:43), Max: 103 (23 Dec 2020 06:23)  HR: 112 (23 Dec 2020 07:43) (112 - 126)  BP: 115/59 (23 Dec 2020 07:43) (115/59 - 125/70)  RR: 22 (23 Dec 2020 07:44) (20 - 22)  SpO2: 96% (23 Dec 2020 07:44) (91% - 98%)  General: Well developed. Well nourished. No acute distress  HEENT: EOMI. Clear conjunctivae. Moist mucus membrane  Neck: Supple.  Chest: Good air entry. Crackles at bases. No wheezing or rhonchi.   Heart: Normal S1 & S2 with RRR.   Abdomen: Soft. Non-tender. Non-distended. + BS  Ext: No pedal edema. No calf tenderness   Neuro: AAO x 3. No focal deficit. No speech disorder.  Skin: Warm and Dry  Psychiatry: Normal mood and affect Vital Signs   T(C): 38.3 (23 Dec 2020 07:43), Max: 39.4 (23 Dec 2020 06:23)  T(F): 100.9 (23 Dec 2020 07:43), Max: 103 (23 Dec 2020 06:23)  HR: 112 (23 Dec 2020 07:43) (112 - 126)  BP: 115/59 (23 Dec 2020 07:43) (115/59 - 125/70)  RR: 22 (23 Dec 2020 07:44) (20 - 22)  SpO2: 96% (23 Dec 2020 07:44) (91% - 98%)    General: Well developed. Well nourished. No acute distress  HEENT: EOMI. Clear conjunctivae. Moist mucus membrane  Neck: Supple.  Chest: Good air entry. Crackles at bases. No wheezing or rhonchi.   Heart: Normal S1 & S2 with RRR.   Abdomen: Soft. Non-tender. Non-distended. + BS  Ext: No pedal edema. No calf tenderness   Neuro: AAO x 3. No focal deficit. No speech disorder.  Skin: Warm and Dry  Psychiatry: Normal mood and affect Vital Signs Last 24 Hrs  T(C): 38.4 (23 Dec 2020 19:22), Max: 39.4 (23 Dec 2020 06:23)  T(F): 101.1 (23 Dec 2020 19:22), Max: 103 (23 Dec 2020 06:23)  HR: 110 (23 Dec 2020 19:22) (98 - 126)  BP: 127/69 (23 Dec 2020 19:22) (105/56 - 127/69)  BP(mean): 88 (23 Dec 2020 19:22) (88 - 88)  RR: 22 (23 Dec 2020 19:22) (20 - 22)  SpO2: 92% (23 Dec 2020 19:22) (91% - 98%)    GENERAL- NAD  EAR/NOSE/MOUTH/THROAT - no pharyngeal exudates, no oral lesions,  MMM  EYES- HARPER, conjunctiva and Sclera clear  NECK- supple  RESPIRATORY-  rales at bases auscultation bilaterally  CARDIOVASCULAR - SIS2, RRR  GI - soft NT BS present  EXTREMITIES- no pedal edema  NEUROLOGY- no gross focal deficits  SKIN- no rashes, warm to touch  PSYCHIATRY- AAO X 3  MUSCULOSKELETAL- ROM normal

## 2020-12-23 NOTE — H&P ADULT - ASSESSMENT
INCOMPLETE 55 years old female with PMH of Asthma brought by EMS with shortness of breath. As per patient, she was diagnosed 2 weeks ago with COVID. She was prescribed Z-Pack by her PMD which she finished however SOB is getting worse. She is also complaining of cough, chest discomfort and fever. Her symptoms have been progressively worsening for 2 days so she called EMS this morning. She was found to be hypoxic to 86% and was placed on supplemental oxygen 4LNC by EMS.    In ER, she was found to be febrile to 103. She was given Tylenol + Dexamethasone and she is currently satting around 95% on 1L NC.     1) Acute Respiratory Failure with Hypoxia  - secondary to COVID-19 Pneumonia  - CTA Chest to rule out PE  - Dexamethasone 6 mg IVP daily   - Tylenol, Albuterol and Robitussin PRN  - Continue supplemental oxygen via NC, wean off as tolerated  - Encourage for prone position every 2-3 hours for 30 minutes  - Incentive spirometry  - Will consider ID Consult for Remdesivir if she is persistently hypoxic     2) Asthma  - No exacerbation  - Albuterol PRN    3) Elevated LFTs  - Likely due to COVID  - Monitor     DVT Prophylaxis -- Lovenox 40 mg SQ    Dispo: Home in 24-48 hours.

## 2020-12-23 NOTE — ED PROVIDER NOTE - CLINICAL SUMMARY MEDICAL DECISION MAKING FREE TEXT BOX
pt with known covid outpt, worsening fever and sob with hypoxia to ~90% on room air here. per ems was 85% prior to oxygen. labs, oxygen, dexamethasone, reassess

## 2020-12-23 NOTE — ED PROVIDER NOTE - OBJECTIVE STATEMENT
56 y/o female hx asthma c/o 2 weeks of covid symptoms (tested positive ~2 weeks ago) with worsening sob, and fever over past 2 days. States hasn't been able to break fever. Last tylenol 6 hours PTA. Mild cough, Occasional chest discomfort, no abd pain, no n/v/diarrhea. No other complaints.     ROS: + fever/chills. No eye pain/changes in vision, No ear pain/sore throat/dysphagia, No palpitations. No cough/. No abdominal pain, N/V/D, no black/bloody bm. No dysuria/frequency/discharge, No headache. No Dizziness.    No rashes or breaks in skin. No numbness/tingling/weakness.

## 2020-12-23 NOTE — DISCHARGE NOTE PROVIDER - CARE PROVIDER_API CALL
Dada Oseguera)  Family Medicine  [address] [place] [address]  Phone: [phone]  Fax: [phone]  Follow Up Time:

## 2020-12-23 NOTE — DISCHARGE NOTE PROVIDER - HOSPITAL COURSE
55 years old female with PMH of Asthma brought by EMS with shortness of breath. As per patient, she was diagnosed 2 weeks ago with COVID. She was prescribed Z-Pack by her PMD which she finished however SOB is getting worse. She is also complaining of cough, chest discomfort and fever. Her symptoms have been progressively worsening for 2 days so she called EMS this morning. She was found to be hypoxic to 86% and was placed on supplemental oxygen 4LNC by EMS.    In ER, she was found to be febrile to 103. She was given Tylenol + Dexamethasone and she is currently satting around 95% on 1L NC.   She was admitted with Acute Respiratory Failure with Hypoxia secondary to COVID-19 Pneumonia. CTA Chest ruled out PE    1) Acute Respiratory Failure with Hypoxia  - secondary to COVID-19 Pneumonia  - CTA Chest to rule out PE  - Dexamethasone 6 mg IVP daily   - Tylenol, Albuterol and Robitussin PRN  - Continue supplemental oxygen via NC, wean off as tolerated  - Encourage for prone position every 2-3 hours for 30 minutes  - Incentive spirometry  - Will consider ID Consult for Remdesivir if she is persistently hypoxic 55 years old female with PMH of Asthma brought by EMS with shortness of breath. As per patient, she was diagnosed 2 weeks ago with COVID. She was prescribed Z-Pack by her PMD which she finished however SOB is getting worse. She is also complaining of cough, chest discomfort and fever. Her symptoms have been progressively worsening for 2 days so she called EMS this morning. She was found to be hypoxic to 86% and was placed on supplemental oxygen 4LNC by EMS.    In ER, she was found to be febrile to 103. She was given Tylenol + Dexamethasone and she is currently satting around 95% on 1L NC.   She was admitted with Acute Respiratory Failure with Hypoxia secondary to COVID-19 Pneumonia. CTA Chest ruled out PE but it showed 8 mm right middle lobe pulmonary nodule. She will need repeat CT Chest in 3 months. She is being transferred to [facility] due to COVID surge.     Time spent: 36 minutes        55 years old female with PMH of Asthma brought by EMS with shortness of breath. As per patient, she was diagnosed 2 weeks ago with COVID. She was prescribed Z-Pack by her PMD which she finished however SOB is getting worse. She is also complaining of cough, chest discomfort and fever. Her symptoms have been progressively worsening for 2 days so she called EMS this morning. She was found to be hypoxic to 86% and was placed on supplemental oxygen 4LNC by EMS.    In ER, she was found to be febrile to 103. She was given Tylenol + Dexamethasone and she is currently satting around 95% on 1L NC.   She was admitted with Acute Respiratory Failure with Hypoxia secondary to COVID-19 Pneumonia. CTA Chest ruled out PE but it showed 8 mm right middle lobe pulmonary nodule. She will need repeat CT Chest in 3 months. She is being transferred to [facility] due to COVID surge.     Time spent: 36 minutes

## 2020-12-23 NOTE — H&P ADULT - NSHPREVIEWOFSYSTEMS_GEN_ALL_CORE
REVIEW OF SYSTEMS:    CONSTITUTIONAL: Pos fever  EYES/ENT: No visual changes;  No vertigo or throat pain   NECK: No pain or stiffness  RESPIRATORY: Pos sob  CARDIOVASCULAR: No chest pain or palpitations  GASTROINTESTINAL: No abdominal or epigastric pain. No nausea, vomiting, or hematemesis; No diarrhea or constipation. No melena or hematochezia.  GENITOURINARY: No dysuria, frequency or hematuria  NEUROLOGICAL: No numbness or weakness  SKIN: No itching, burning, rashes, or lesions   All other review of systems is negative unless indicated above. per hpi

## 2020-12-23 NOTE — H&P ADULT - HISTORY OF PRESENT ILLNESS
INCOMPLETE 55 years old female with PMH of Asthma brought by EMS with shortness of breath. As per patient, she was diagnosed 2 weeks ago with COVID. She was prescribed Z-Pack by her PMD which she finished however SOB is getting worse. She is also complaining of cough, chest discomfort and fever. Her symptoms have been progressively worsening for 2 days so she called EMS this morning. She was found to be hypoxic to 86% and was placed on supplemental oxygen 4LNC by EMS.    In ER, she was found to be febrile to 103. She was given Tylenol + Dexamethasone and she is currently satting around 95% on 1L NC.

## 2020-12-23 NOTE — DISCHARGE NOTE PROVIDER - NSDCMRMEDTOKEN_GEN_ALL_CORE_FT
albuterol 0.63 mg/3 mL (0.021%) inhalation solution: 3 milliliter(s) inhaled 4 times a day, As Needed  albuterol 90 mcg/inh inhalation aerosol: 2 puff(s) inhaled every 6 hours, As Needed  fluticasone 50 mcg/inh inhalation powder: 1 puff(s) inhaled 2 times a day  fluticasone-salmeterol 113 mcg-14 mcg/inh inhalation powder: 1 puff(s) inhaled 2 times a day, As Needed  Saline Nasal Mist 0.65% nasal solution: 2 drop(s) nasal every 6 hours, As Needed   acetaminophen 325 mg oral tablet: 2 tab(s) orally every 6 hours, As needed, Temp greater or equal to 38C (100.4F), Mild Pain (1 - 3), Moderate Pain (4 - 6)  albuterol 0.63 mg/3 mL (0.021%) inhalation solution: 3 milliliter(s) inhaled 4 times a day, As Needed  albuterol 90 mcg/inh inhalation aerosol: 2 puff(s) inhaled every 6 hours, As Needed  ascorbic acid 500 mg oral tablet: 1 tab(s) orally once a day  cholecalciferol oral tablet: 1000 unit(s) orally once a day  dexamethasone 6 mg oral tablet: 1 tab(s) orally once a day   fluticasone 50 mcg/inh inhalation powder: 1 puff(s) inhaled 2 times a day  fluticasone-salmeterol 113 mcg-14 mcg/inh inhalation powder: 1 puff(s) inhaled 2 times a day, As Needed  guaiFENesin 100 mg/5 mL oral liquid: 5 milliliter(s) orally every 6 hours, As needed, Cough  pantoprazole 40 mg oral delayed release tablet: 1 tab(s) orally once a day while on steroids.   Saline Nasal Mist 0.65% nasal solution: 2 drop(s) nasal every 6 hours, As Needed

## 2020-12-23 NOTE — H&P ADULT - HISTORY OF PRESENT ILLNESS
55 years old female with PMH of Asthma brought by EMS with shortness of breath. As per patient, she was diagnosed 2 weeks ago with COVID. She was prescribed Z-Pack by her PMD which she finished however SOB is getting worse. She is also complaining of cough, chest discomfort and fever. Her symptoms have been progressively worsening for 2 days so she called EMS this morning. She was found to be hypoxic to 86% and was placed on supplemental oxygen 4LNC by EMS.    In ER, she was found to be febrile to 103. She was given Tylenol + Dexamethasone and she is currently satting around 95% on 1L NC.  55 years old female with PMH of Asthma  who p/w SOB. Pt  was dx'd with COVID 2 weeks ago and rx'd Zpak by PCP as outpt. SOB progressively worsened so she came to [facility] ED.  Pt was found to be hypoxic to 86% with fever of 103 and was placed on supplemental oxygen 4LNC. Pt was given Tylenol + Dexamethasone.   Pt transferred to [facility] as overflow.  55 years old female with PMH of Asthma  who p/w SOB. Pt  was dx'd with COVID 2 weeks ago and rx'd Zpak by PCP as outpt. SOB progressively worsened so she came to [facility] ED.  Pt was found to be hypoxic to 86% with fever of 103 and was placed on supplemental oxygen 4LNC. Pt was given Tylenol + Dexamethasone.   Pt transferred to [facility] as overflow.   she still c/o productive cough and sob on exertion, and while speaking, no n/v, no chest pain. no dysuria

## 2020-12-23 NOTE — ED ADULT NURSE REASSESSMENT NOTE - NS ED NURSE REASSESS COMMENT FT1
pt resting comfortably on stretcher, a&ox3, no acute distress, tolerated lunch tray well. pt offers no complaints at this time. pt denies chest pain. vitals stable. report given to ANNAMARIA Granda in [place] ED. pt aware of transfer, offers no questions or complaints at this time. will continue to reassess.

## 2020-12-23 NOTE — H&P ADULT - NSHPLABSRESULTS_GEN_ALL_CORE
LABS:                        12.3   10.27 )-----------( 256      ( 23 Dec 2020 06:51 )             36.8     12-23    139  |  103  |  9.0  ----------------------------<  97  3.6   |  23.0  |  0.68    Ca    8.5<L>      23 Dec 2020 06:51    TPro  7.8  /  Alb  3.2<L>  /  TBili  0.6  /  DBili  x   /  AST  101<H>  /  ALT  96<H>  /  AlkPhos  87  12-23

## 2020-12-23 NOTE — H&P ADULT - PROBLEM SELECTOR PLAN 1
Acute Respiratory Failure with Hypoxia sec to to COVID-19 Pneumonia,   Elevated LFT's likely sec to COVID  admit to [facility] with tele  continuos 02 moniotring  CTA Chest- neg for PE.   incidental finding of 8mm pulm nodule. F/u as oupt  Dexamethasone 6 mg daily   c/w Remdevisir  Continue supplemental oxygen  Encourage for proning  Lovenox for ppx

## 2020-12-23 NOTE — DISCHARGE NOTE PROVIDER - NSDCFUADDINST_GEN_ALL_CORE_FT
8 mm right middle lobe pulmonary nodule; a follow-up noncontrast chest CT is recommended in 3 months. You were found to have 8 mm right middle lobe pulmonary nodule. You will need a follow-up noncontrast chest CT in 3 months.   Please follow up with PMD.

## 2020-12-23 NOTE — ED PROVIDER NOTE - PHYSICAL EXAMINATION
Gen: No acute distress, non toxic  HEENT: Mucous membranes moist, pink conjunctivae, EOMI  CV: RRR, nl s1/s2.  Resp: CTAB, b/l crackles. satting ~90% room air.   GI: Abdomen soft, NT, ND. No rebound, no guarding  : No CVAT  Neuro: A&O x 3, moving all 4 extremities  MSK: No spine or joint tenderness to palpation  Skin: No rashes. intact and perfused.

## 2020-12-23 NOTE — ED ADULT NURSE NOTE - OBJECTIVE STATEMENT
assumed care of pt @ this time. received pt a&ox3, no acute distress, breaths labored with spo2 91% on RA, pt placed on 2lpm with improvement. pt maintaining sinus tachycardia on cardiac monitor. pt denies chest pain or back pain. reports was dx with covid 14 days ago and has had increasing sob and fever x 2 days. last took tylenol at midnight. denies abdominal pain, diarrhea, nausea, vomiting.

## 2020-12-23 NOTE — H&P ADULT - NSHPPHYSICALEXAM_GEN_ALL_CORE
Vital Signs   T(C): 38.3 (23 Dec 2020 07:43), Max: 39.4 (23 Dec 2020 06:23)  T(F): 100.9 (23 Dec 2020 07:43), Max: 103 (23 Dec 2020 06:23)  HR: 112 (23 Dec 2020 07:43) (112 - 126)  BP: 115/59 (23 Dec 2020 07:43) (115/59 - 125/70)  RR: 22 (23 Dec 2020 07:44) (20 - 22)  SpO2: 96% (23 Dec 2020 07:44) (91% - 98%)  General: Well developed. Well nourished. No acute distress  HEENT: EOMI. Clear conjunctivae. Moist mucus membrane  Neck: Supple.  Chest: Good air entry. Crackles at bases. No wheezing or rhonchi.   Heart: Normal S1 & S2 with RRR.   Abdomen: Soft. Non-tender. Non-distended. + BS  Ext: No pedal edema. No calf tenderness   Neuro: AAO x 3. No focal deficit. No speech disorder.  Skin: Warm and Dry  Psychiatry: Normal mood and affect

## 2020-12-23 NOTE — DISCHARGE NOTE PROVIDER - NSDCFUADDAPPT_GEN_ALL_CORE_FT
Follow up with Dr. Oseguera in 2 weeks after discharge from [facility].  Follow up with Dr. Oseguera in 2 weeks after discharge from [facility].

## 2020-12-23 NOTE — H&P ADULT - NSHPLABSRESULTS_GEN_ALL_CORE
LABS:                        12.3   10.27 )-----------( 256      ( 23 Dec 2020 06:51 )             36.8     12-23    139  |  103  |  9.0  ----------------------------<  97  3.6   |  23.0  |  0.68    Ca    8.5<L>      23 Dec 2020 06:51    TPro  7.8  /  Alb  3.2<L>  /  TBili  0.6  /  DBili  x   /  AST  101<H>  /  ALT  96<H>  /  AlkPhos  87  12-23 LABS:                        12.3   10.27 )-----------( 256      ( 23 Dec 2020 06:51 )             36.8     12-23    139  |  103  |  9.0  ----------------------------<  97  3.6   |  23.0  |  0.68    Ca    8.5<L>      23 Dec 2020 06:51    TPro  7.8  /  Alb  3.2<L>  /  TBili  0.6  /  DBili  x   /  AST  101<H>  /  ALT  96<H>  /  AlkPhos  87  12-23    Labs reviewed:     CXR personally reviewed:  from: Xray Chest 1 View- PORTABLE-Urgent (12.23.20 @ 07:16) >      IMPRESSION: New multifocal patchy airspace opacity suspicious for pneumonia. COVID-19 pneumonia is also possible.    < end of copied text >    < from: CT Angio Chest w/ IV Cont (12.23.20 @ 10:47) >      IMPRESSION:  Limited evaluation of the segmental and subsegmental pulmonary arteries in the lower lungs secondary to respiratory motion, otherwise no pulmonary embolism.    Bilateral lung opacities consistent with multifocal infection or inflammation including viral pneumonia (COVID 19).    8 mm right middle lobe pulmonary nodule; a follow-up noncontrast chest CT is recommended in 3 months.    < end of copied text >        ECG reviewed and interpreted: < from: 12 Lead ECG (12.23.20 @ 07:38) >      Ventricular Rate 111 BPM    Atrial Rate 111 BPM    P-R Interval 126 ms    QRS Duration 80 ms    Q-T Interval 324 ms    QTC Calculation(Bazett) 440 ms    P Axis 61 degrees    R Axis 20 degrees    T Axis 27 degrees    Diagnosis Line Sinus tachycardia  Otherwise normal ECG    < end of copied text >

## 2020-12-23 NOTE — ED ADULT TRIAGE NOTE - CHIEF COMPLAINT QUOTE
patient from home with complaints of difficulty breathing states that she was diagnosed with covid-19 14 days ago and she has been having increasing shortness of breath and difficulty breathing, patient states that she last took tylenol at midnight for a fever, as per ems patient spo2 on RA 86% patient placed on 4L NC with improvement.

## 2020-12-23 NOTE — H&P ADULT - ASSESSMENT
55 years old female with PMH of Asthma brought by EMS with shortness of breath. As per patient, she was diagnosed 2 weeks ago with COVID. She was prescribed Z-Pack by her PMD which she finished however SOB is getting worse. She is also complaining of cough, chest discomfort and fever. Her symptoms have been progressively worsening for 2 days so she called EMS this morning. She was found to be hypoxic to 86% and was placed on supplemental oxygen 4LNC by EMS.    In ER, she was found to be febrile to 103. She was given Tylenol + Dexamethasone and she is currently satting around 95% on 1L NC.     1) Acute Respiratory Failure with Hypoxia  - secondary to COVID-19 Pneumonia  - CTA Chest to rule out PE  - Dexamethasone 6 mg IVP daily   - Tylenol, Albuterol and Robitussin PRN  - Continue supplemental oxygen via NC, wean off as tolerated  - Encourage for prone position every 2-3 hours for 30 minutes  - Incentive spirometry  - Will consider ID Consult for Remdesivir if she is persistently hypoxic     2) Asthma  - No exacerbation  - Albuterol PRN    3) Elevated LFTs  - Likely due to COVID  - Monitor     DVT Prophylaxis -- Lovenox 40 mg SQ    Dispo: Home in 24-48 hours. Acute Respiratory Failure with Hypoxia sec to to COVID-19 Pneumonia, Elevated LFT's likely sec to COVID    CTA Chest- neg for PE.   incidental finding of 8mm pulm nodule. F/u as oupt  Dexamethasone 6 mg daily   Remdevisir  Continue supplemental oxygen  Encourage for proning  Lovenox     55 years old female with PMH of Asthma  who p/w SOB. admitted for Acute Respiratory Failure with Hypoxia sec to to COVID-19 Pneumonia, Elevated LFT's likely sec to COVID      CAPRINI SCORE [CLOT]    AGE RELATED RISK FACTORS                                                       MOBILITY RELATED FACTORS  [ x] Age 41-60 years                                            (1 Point)                  [ ] Bed rest                                                        (1 Point)  [ ] Age: 61-74 years                                           (2 Points)                 [ ] Plaster cast                                                   (2 Points)  [ ] Age= 75 years                                              (3 Points)                 [ ] Bed bound for more than 72 hours                 (2 Points)    DISEASE RELATED RISK FACTORS                                               GENDER SPECIFIC FACTORS  [ ] Edema in the lower extremities                       (1 Point)                  [ ] Pregnancy                                                     (1 Point)  [ ] Varicose veins                                               (1 Point)                  [ ] Post-partum < 6 weeks                                   (1 Point)             [ ] BMI > 25 Kg/m2                                            (1 Point)                  [ ] Hormonal therapy  or oral contraception          (1 Point)                 [ ] Sepsis (in the previous month)                        (1 Point)                  [ ] History of pregnancy complications                 (1 point)  [ ] Pneumonia or serious lung disease                                               [ ] Unexplained or recurrent                     (1 Point)           (in the previous month)                               (1 Point)  [ ] Abnormal pulmonary function test                     (1 Point)                 SURGERY RELATED RISK FACTORS  [ ] Acute myocardial infarction                              (1 Point)                 [ ]  Section                                             (1 Point)  [ ] Congestive heart failure (in the previous month)  (1 Point)               [ ] Minor surgery                                                  (1 Point)   [ ] Inflammatory bowel disease                             (1 Point)                 [ ] Arthroscopic surgery                                        (2 Points)  [ ] Central venous access                                      (2 Points)                [ ] General surgery lasting more than 45 minutes   (2 Points)       [ ] Stroke (in the previous month)                          (5 Points)               [ ] Elective arthroplasty                                         (5 Points)                                                                                                                                               HEMATOLOGY RELATED FACTORS                                                 TRAUMA RELATED RISK FACTORS  [ ] Prior episodes of VTE                                     (3 Points)                 [ ] Fracture of the hip, pelvis, or leg                       (5 Points)  [ ] Positive family history for VTE                         (3 Points)                 [ ] Acute spinal cord injury (in the previous month)  (5 Points)  [ ] Prothrombin 98851 A                                     (3 Points)                 [ ] Paralysis  (less than 1 month)                             (5 Points)  [ ] Factor V Leiden                                             (3 Points)                  [ ] Multiple Trauma within 1 month                        (5 Points)  [ ] Lupus anticoagulants                                     (3 Points)                                                           [ ] Anticardiolipin antibodies                               (3 Points)                                                       [ ] High homocysteine in the blood                      (3 Points)                                             [ ] Other congenital or acquired thrombophilia      (3 Points)                                                [ ] Heparin induced thrombocytopenia                  (3 Points)                                          Total Score [  1        ]

## 2020-12-23 NOTE — DISCHARGE NOTE PROVIDER - NSDCCPCAREPLAN_GEN_ALL_CORE_FT
PRINCIPAL DISCHARGE DIAGNOSIS  Diagnosis: Acute respiratory failure with hypoxia  Assessment and Plan of Treatment: Due to COVID-19 Pneumonia  Continue medications as perscribed.  Continue care at [facility].

## 2020-12-23 NOTE — DISCHARGE NOTE PROVIDER - NSDCFUSCHEDAPPT_GEN_ALL_CORE_FT
SALAZAR GIORDANO ; 02/05/2019 ; NPP [facility] [address] [facility]

## 2020-12-24 LAB
ALBUMIN SERPL ELPH-MCNC: 2.6 G/DL — LOW (ref 3.3–5)
ALBUMIN SERPL ELPH-MCNC: 2.6 G/DL — LOW (ref 3.3–5)
ALP SERPL-CCNC: 79 U/L — SIGNIFICANT CHANGE UP (ref 40–120)
ALP SERPL-CCNC: 80 U/L — SIGNIFICANT CHANGE UP (ref 40–120)
ALT FLD-CCNC: 116 U/L — HIGH (ref 10–45)
ALT FLD-CCNC: 121 U/L — HIGH (ref 10–45)
ANION GAP SERPL CALC-SCNC: 14 MMOL/L — SIGNIFICANT CHANGE UP (ref 5–17)
AST SERPL-CCNC: 79 U/L — HIGH (ref 10–40)
AST SERPL-CCNC: 84 U/L — HIGH (ref 10–40)
BASOPHILS # BLD AUTO: 0 K/UL — SIGNIFICANT CHANGE UP (ref 0–0.2)
BASOPHILS NFR BLD AUTO: 0 % — SIGNIFICANT CHANGE UP (ref 0–2)
BILIRUB DIRECT SERPL-MCNC: 0.1 MG/DL — SIGNIFICANT CHANGE UP (ref 0–0.2)
BILIRUB INDIRECT FLD-MCNC: 0.3 MG/DL — SIGNIFICANT CHANGE UP (ref 0.2–1)
BILIRUB SERPL-MCNC: 0.4 MG/DL — SIGNIFICANT CHANGE UP (ref 0.2–1.2)
BILIRUB SERPL-MCNC: 0.4 MG/DL — SIGNIFICANT CHANGE UP (ref 0.2–1.2)
BUN SERPL-MCNC: 17 MG/DL — SIGNIFICANT CHANGE UP (ref 7–23)
CALCIUM SERPL-MCNC: 9.1 MG/DL — SIGNIFICANT CHANGE UP (ref 8.4–10.5)
CHLORIDE SERPL-SCNC: 105 MMOL/L — SIGNIFICANT CHANGE UP (ref 96–108)
CO2 SERPL-SCNC: 25 MMOL/L — SIGNIFICANT CHANGE UP (ref 22–31)
CREAT SERPL-MCNC: 0.85 MG/DL — SIGNIFICANT CHANGE UP (ref 0.5–1.3)
CREAT SERPL-MCNC: 0.88 MG/DL — SIGNIFICANT CHANGE UP (ref 0.5–1.3)
EOSINOPHIL # BLD AUTO: 0 K/UL — SIGNIFICANT CHANGE UP (ref 0–0.5)
EOSINOPHIL NFR BLD AUTO: 0 % — SIGNIFICANT CHANGE UP (ref 0–6)
GLUCOSE SERPL-MCNC: 140 MG/DL — HIGH (ref 70–99)
HCT VFR BLD CALC: 35.6 % — SIGNIFICANT CHANGE UP (ref 34.5–45)
HGB BLD-MCNC: 11.6 G/DL — SIGNIFICANT CHANGE UP (ref 11.5–15.5)
LYMPHOCYTES # BLD AUTO: 1.01 K/UL — SIGNIFICANT CHANGE UP (ref 1–3.3)
LYMPHOCYTES # BLD AUTO: 11 % — LOW (ref 13–44)
MAGNESIUM SERPL-MCNC: 2.5 MG/DL — SIGNIFICANT CHANGE UP (ref 1.6–2.6)
MANUAL SMEAR VERIFICATION: SIGNIFICANT CHANGE UP
MCHC RBC-ENTMCNC: 30.6 PG — SIGNIFICANT CHANGE UP (ref 27–34)
MCHC RBC-ENTMCNC: 32.6 GM/DL — SIGNIFICANT CHANGE UP (ref 32–36)
MCV RBC AUTO: 93.9 FL — SIGNIFICANT CHANGE UP (ref 80–100)
MONOCYTES # BLD AUTO: 0.37 K/UL — SIGNIFICANT CHANGE UP (ref 0–0.9)
MONOCYTES NFR BLD AUTO: 4 % — SIGNIFICANT CHANGE UP (ref 2–14)
NEUTROPHILS # BLD AUTO: 7.79 K/UL — HIGH (ref 1.8–7.4)
NEUTROPHILS NFR BLD AUTO: 83 % — HIGH (ref 43–77)
NEUTS BAND # BLD: 2 % — SIGNIFICANT CHANGE UP (ref 0–8)
NRBC # BLD: 0 /100 — SIGNIFICANT CHANGE UP (ref 0–0)
PLAT MORPH BLD: NORMAL — SIGNIFICANT CHANGE UP
PLATELET # BLD AUTO: 248 K/UL — SIGNIFICANT CHANGE UP (ref 150–400)
POLYCHROMASIA BLD QL SMEAR: SLIGHT — SIGNIFICANT CHANGE UP
POTASSIUM SERPL-MCNC: 5.2 MMOL/L — SIGNIFICANT CHANGE UP (ref 3.5–5.3)
POTASSIUM SERPL-SCNC: 5.2 MMOL/L — SIGNIFICANT CHANGE UP (ref 3.5–5.3)
PROT SERPL-MCNC: 7.9 G/DL — SIGNIFICANT CHANGE UP (ref 6–8.3)
PROT SERPL-MCNC: 8 G/DL — SIGNIFICANT CHANGE UP (ref 6–8.3)
RBC # BLD: 3.79 M/UL — LOW (ref 3.8–5.2)
RBC # FLD: 12.4 % — SIGNIFICANT CHANGE UP (ref 10.3–14.5)
RBC BLD AUTO: ABNORMAL
SODIUM SERPL-SCNC: 144 MMOL/L — SIGNIFICANT CHANGE UP (ref 135–145)
WBC # BLD: 9.17 K/UL — SIGNIFICANT CHANGE UP (ref 3.8–10.5)
WBC # FLD AUTO: 9.17 K/UL — SIGNIFICANT CHANGE UP (ref 3.8–10.5)

## 2020-12-24 PROCEDURE — 99233 SBSQ HOSP IP/OBS HIGH 50: CPT

## 2020-12-24 RX ORDER — POTASSIUM CHLORIDE 20 MEQ
20 PACKET (EA) ORAL
Refills: 0 | Status: DISCONTINUED | OUTPATIENT
Start: 2020-12-24 | End: 2020-12-24

## 2020-12-24 RX ORDER — DEXAMETHASONE 0.5 MG/5ML
1 ELIXIR ORAL
Qty: 9 | Refills: 0
Start: 2020-12-24 | End: 2021-01-01

## 2020-12-24 RX ADMIN — Medication 6 MILLIGRAM(S): at 05:39

## 2020-12-24 RX ADMIN — ENOXAPARIN SODIUM 40 MILLIGRAM(S): 100 INJECTION SUBCUTANEOUS at 11:39

## 2020-12-24 RX ADMIN — Medication 1000 UNIT(S): at 11:39

## 2020-12-24 RX ADMIN — Medication 500 MILLIGRAM(S): at 11:39

## 2020-12-24 RX ADMIN — ZINC SULFATE TAB 220 MG (50 MG ZINC EQUIVALENT) 220 MILLIGRAM(S): 220 (50 ZN) TAB at 11:39

## 2020-12-24 RX ADMIN — Medication 100 MILLIGRAM(S): at 05:39

## 2020-12-24 RX ADMIN — REMDESIVIR 500 MILLIGRAM(S): 5 INJECTION INTRAVENOUS at 20:58

## 2020-12-24 RX ADMIN — PANTOPRAZOLE SODIUM 40 MILLIGRAM(S): 20 TABLET, DELAYED RELEASE ORAL at 12:03

## 2020-12-24 NOTE — PROGRESS NOTE ADULT - PROBLEM SELECTOR PLAN 1
Acute Respiratory Failure with Hypoxia sec to to COVID-19 Pneumonia,   Elevated LFT's likely sec to COVID  continuos 02 moniotring  CTA Chest- neg for PE.   incidental finding of 8mm pulm nodule. F/u as oupt  Dexamethasone 6 mg daily   c/w Remdevisir  Continue supplemental oxygen  Encourage for proning  Lovenox for ppx Acute Respiratory Failure with Hypoxia sec to to COVID-19 Pneumonia- stable ,   Elevated LFT's likely sec to COVID  continuos 02 moniotring-  currently on 3l NC 95%sat  CTA Chest- neg for PE.   incidental finding of 8mm pulm nodule. F/u as oupt  Dexamethasone 6 mg daily   Remdevisir  Encourage for proning  Lovenox for ppx Acute Respiratory Failure with Hypoxia sec to COVID-19 Pneumonia- stable    Elevated LFTs likely sec to COVID  continuous 02 monitoring-  currently on 3l NC 95%sat  CTA Chest- neg for PE.   incidental finding of 8mm pulm nodule. F/u as oupt  Dexamethasone 6 mg daily   Remdevisir  Encourage for proning  Lovenox for ppx

## 2020-12-24 NOTE — PROGRESS NOTE ADULT - SUBJECTIVE AND OBJECTIVE BOX
Patient is a 55y old  Female who presents with a chief complaint of Shortness of breath (23 Dec 2020 15:01)      Patient seen and examined at bedside.    ALLERGIES:  No Known Allergies    MEDICATIONS  (STANDING):  ascorbic acid 500 milliGRAM(s) Oral daily  cholecalciferol 1000 Unit(s) Oral daily  dexAMETHasone  Injectable 6 milliGRAM(s) IV Push daily  enoxaparin Injectable 40 milliGRAM(s) SubCutaneous daily  pantoprazole    Tablet 40 milliGRAM(s) Oral daily  remdesivir  IVPB   IV Intermittent   remdesivir  IVPB 100 milliGRAM(s) IV Intermittent every 24 hours  zinc sulfate 220 milliGRAM(s) Oral daily    MEDICATIONS  (PRN):  acetaminophen   Tablet .. 650 milliGRAM(s) Oral every 6 hours PRN Temp greater or equal to 38C (100.4F), Mild Pain (1 - 3), Moderate Pain (4 - 6)  ALBUTerol    90 MICROgram(s) HFA Inhaler 2 Puff(s) Inhalation every 6 hours PRN Shortness of Breath and/or Wheezing  benzocaine 15 mG/menthol 3.6 mG (Sugar-Free) Lozenge 1 Lozenge Oral every 4 hours PRN Sore Throat  guaiFENesin   Syrup  (Sugar-Free) 100 milliGRAM(s) Oral every 6 hours PRN Cough  ondansetron Injectable 4 milliGRAM(s) IV Push every 6 hours PRN Nausea and/or Vomiting    Vital Signs Last 24 Hrs  T(F): 99.8 (23 Dec 2020 20:20), Max: 101.1 (23 Dec 2020 19:22)  HR: 94 (23 Dec 2020 20:20) (94 - 112)  BP: 118/74 (23 Dec 2020 20:20) (105/56 - 127/69)  RR: 16 (23 Dec 2020 20:20) (16 - 22)  SpO2: 94% (23 Dec 2020 20:20) (91% - 97%)  I&O's Summary    PHYSICAL EXAM:  General: NAD, A/O x 3  ENT: MMM  Neck: Supple, No JVD  Lungs: Clear to auscultation bilaterally, Non labored breathing   Cardio: RRR, S1/S2, No murmurs  Abdomen: Soft, Nontender, Nondistended; Bowel sounds present  Extremities: No calf tenderness, No pitting edema    LABS:                        11.6   9.17  )-----------( 248      ( 24 Dec 2020 06:45 )             35.6     12-23    139  |  103  |  9.0  ----------------------------<  97  3.6   |  23.0  |  0.68    Ca    8.5      23 Dec 2020 06:51    TPro  7.8  /  Alb  3.2  /  TBili  0.6  /  DBili  x   /  AST  101  /  ALT  96  /  AlkPhos  87  12-23    eGFR if : 116 mL/min/1.73M2 (12-23-20 @ 06:51)  eGFR if Non African American: 100 mL/min/1.73M2 (12-23-20 @ 06:51)                                  RADIOLOGY & ADDITIONAL TESTS:    Care Discussed with Consultants/Other Providers:    Patient is a 55y old  Female who presents with a chief complaint of Shortness of breath (23 Dec 2020 15:01)      Patient seen and examined at bedside.  Pt lying in bed with 3 liters nasal canula in place.  Without any complaints     ALLERGIES:  No Known Allergies    MEDICATIONS  (STANDING):  ascorbic acid 500 milliGRAM(s) Oral daily  cholecalciferol 1000 Unit(s) Oral daily  dexAMETHasone  Injectable 6 milliGRAM(s) IV Push daily  enoxaparin Injectable 40 milliGRAM(s) SubCutaneous daily  pantoprazole    Tablet 40 milliGRAM(s) Oral daily  remdesivir  IVPB   IV Intermittent   remdesivir  IVPB 100 milliGRAM(s) IV Intermittent every 24 hours  zinc sulfate 220 milliGRAM(s) Oral daily    MEDICATIONS  (PRN):  acetaminophen   Tablet .. 650 milliGRAM(s) Oral every 6 hours PRN Temp greater or equal to 38C (100.4F), Mild Pain (1 - 3), Moderate Pain (4 - 6)  ALBUTerol    90 MICROgram(s) HFA Inhaler 2 Puff(s) Inhalation every 6 hours PRN Shortness of Breath and/or Wheezing  benzocaine 15 mG/menthol 3.6 mG (Sugar-Free) Lozenge 1 Lozenge Oral every 4 hours PRN Sore Throat  guaiFENesin   Syrup  (Sugar-Free) 100 milliGRAM(s) Oral every 6 hours PRN Cough  ondansetron Injectable 4 milliGRAM(s) IV Push every 6 hours PRN Nausea and/or Vomiting    Vital Signs Last 24 Hrs  T(F): 99.8 (23 Dec 2020 20:20), Max: 101.1 (23 Dec 2020 19:22)  HR: 94 (23 Dec 2020 20:20) (94 - 112)  BP: 118/74 (23 Dec 2020 20:20) (105/56 - 127/69)  RR: 16 (23 Dec 2020 20:20) (16 - 22)  SpO2: 94% (23 Dec 2020 20:20) (91% - 97%)  I&O's Summary    PHYSICAL EXAM:  General: 56 y/o female in NAD, A/O x 3 with 3 liters nasal canula 95% sat   ENT: MMM  Neck: Supple, No JVD  Lungs: Course BS bilaterally, Non labored breathing   Cardio: RRR, S1/S2, No murmurs  Abdomen: Soft, Nontender, Nondistended; Bowel sounds present  Extremities: No calf tenderness, No pitting edema    LABS:                        11.6   9.17  )-----------( 248      ( 24 Dec 2020 06:45 )             35.6     12-23    139  |  103  |  9.0  ----------------------------<  97  3.6   |  23.0  |  0.68    Ca    8.5      23 Dec 2020 06:51    TPro  7.8  /  Alb  3.2  /  TBili  0.6  /  DBili  x   /  AST  101  /  ALT  96  /  AlkPhos  87  12-23    eGFR if : 116 mL/min/1.73M2 (12-23-20 @ 06:51)  eGFR if Non African American: 100 mL/min/1.73M2 (12-23-20 @ 06:51)                                  RADIOLOGY & ADDITIONAL TESTS:    Care Discussed with Consultants/Other Providers:    Patient is a 55y old  Female who presents with a chief complaint of Shortness of breath (23 Dec 2020 15:01)    Patient seen and examined at bedside.  Pt lying in bed with 3 liters nasal canula in place.  Without any complaints     ALLERGIES:  No Known Allergies    MEDICATIONS  (STANDING):  ascorbic acid 500 milliGRAM(s) Oral daily  cholecalciferol 1000 Unit(s) Oral daily  dexAMETHasone  Injectable 6 milliGRAM(s) IV Push daily  enoxaparin Injectable 40 milliGRAM(s) SubCutaneous daily  pantoprazole    Tablet 40 milliGRAM(s) Oral daily  remdesivir  IVPB   IV Intermittent   remdesivir  IVPB 100 milliGRAM(s) IV Intermittent every 24 hours  zinc sulfate 220 milliGRAM(s) Oral daily    MEDICATIONS  (PRN):  acetaminophen   Tablet .. 650 milliGRAM(s) Oral every 6 hours PRN Temp greater or equal to 38C (100.4F), Mild Pain (1 - 3), Moderate Pain (4 - 6)  ALBUTerol    90 MICROgram(s) HFA Inhaler 2 Puff(s) Inhalation every 6 hours PRN Shortness of Breath and/or Wheezing  benzocaine 15 mG/menthol 3.6 mG (Sugar-Free) Lozenge 1 Lozenge Oral every 4 hours PRN Sore Throat  guaiFENesin   Syrup  (Sugar-Free) 100 milliGRAM(s) Oral every 6 hours PRN Cough  ondansetron Injectable 4 milliGRAM(s) IV Push every 6 hours PRN Nausea and/or Vomiting    Vital Signs Last 24 Hrs  T(F): 99.8 (23 Dec 2020 20:20), Max: 101.1 (23 Dec 2020 19:22)  HR: 94 (23 Dec 2020 20:20) (94 - 112)  BP: 118/74 (23 Dec 2020 20:20) (105/56 - 127/69)  RR: 16 (23 Dec 2020 20:20) (16 - 22)  SpO2: 94% (23 Dec 2020 20:20) (91% - 97%)  I&O's Summary    PHYSICAL EXAM:  General: 56 y/o female in NAD, A/O x 3 with 3 liters nasal canula 95% sat   ENT: MMM  Neck: Supple, No JVD  Lungs: Course BS bilaterally, Non labored breathing   Cardio: RRR, S1/S2, No murmurs  Abdomen: Soft, Nontender, Nondistended; Bowel sounds present  Extremities: No calf tenderness, No pitting edema    LABS:                        11.6   9.17  )-----------( 248      ( 24 Dec 2020 06:45 )             35.6     12-23    139  |  103  |  9.0  ----------------------------<  97  3.6   |  23.0  |  0.68    Ca    8.5      23 Dec 2020 06:51    TPro  7.8  /  Alb  3.2  /  TBili  0.6  /  DBili  x   /  AST  101  /  ALT  96  /  AlkPhos  87  12-23    eGFR if : 116 mL/min/1.73M2 (12-23-20 @ 06:51)  eGFR if Non African American: 100 mL/min/1.73M2 (12-23-20 @ 06:51)                                  RADIOLOGY & ADDITIONAL TESTS:    Care Discussed with Consultants/Other Providers:

## 2020-12-24 NOTE — PROGRESS NOTE ADULT - ATTENDING COMMENTS
54 yo F with asthma, recent dx of COVID 2 weeks prior to admission, presented to [facility] with progressive worsening of breath, hypoxia, and fever. Transferred to [facility] as overflow. She continues to be febrile in the past 24 hours. CTA chest negative for PE. She will be treated with remdesivir for 5 days or until no longer hypoxic, whichever comes sooner. Will work on weaning o2 as tolerated. She will receive treatment with dexamethasone. I will give supplemental vitamins.    I have personally seen and examined patient on the above date.  I discussed the case with Meli Mendoza, and I agree with findings and plan as detailed per note above, which I have amended where appropriate. 54 yo F with asthma, recent dx of COVID 2 weeks prior to admission, presented to [facility] with progressive worsening of breath, hypoxia, and fever. Transferred to [facility] as overflow. She continues to be febrile in the past 24 hours. CTA chest negative for PE. She will be treated with remdesivir for 5 days or until no longer hypoxic, whichever comes sooner. Will work on weaning o2 as tolerated. She will receive treatment with dexamethasone. I will give supplemental vitamins.    Telemetry reviewed - NSR.   Imaging reviewed, CXR and CTA - multifocal airspace opacities.   [facility] records reviewed.     I have personally seen and examined patient on the above date.  I discussed the case with Meli Mendoza, and I agree with findings and plan as detailed per note above, which I have amended where appropriate.

## 2020-12-24 NOTE — PROGRESS NOTE ADULT - PROBLEM SELECTOR PLAN 2
c/w dexa  guaifenesin  albuterol  o2 via NC c/w dexamethasone  guaifenesin  albuterol  3 o2 via NC 95%

## 2020-12-24 NOTE — PROGRESS NOTE ADULT - ASSESSMENT
55 years old female with PMH of Asthma  who p/w SOB. admitted for Acute Respiratory Failure with Hypoxia sec to to COVID-19 Pneumonia, Elevated LFT's likely sec to COVID

## 2020-12-25 LAB
ALBUMIN SERPL ELPH-MCNC: 2.3 G/DL — LOW (ref 3.3–5)
ALP SERPL-CCNC: 68 U/L — SIGNIFICANT CHANGE UP (ref 40–120)
ALT FLD-CCNC: 92 U/L — HIGH (ref 10–45)
AST SERPL-CCNC: 47 U/L — HIGH (ref 10–40)
BILIRUB DIRECT SERPL-MCNC: 0.1 MG/DL — SIGNIFICANT CHANGE UP (ref 0–0.2)
BILIRUB INDIRECT FLD-MCNC: 0.1 MG/DL — LOW (ref 0.2–1)
BILIRUB SERPL-MCNC: 0.2 MG/DL — SIGNIFICANT CHANGE UP (ref 0.2–1.2)
CREAT SERPL-MCNC: 0.89 MG/DL — SIGNIFICANT CHANGE UP (ref 0.5–1.3)
PROT SERPL-MCNC: 7 G/DL — SIGNIFICANT CHANGE UP (ref 6–8.3)

## 2020-12-25 PROCEDURE — 99232 SBSQ HOSP IP/OBS MODERATE 35: CPT

## 2020-12-25 RX ADMIN — Medication 1000 UNIT(S): at 11:49

## 2020-12-25 RX ADMIN — Medication 6 MILLIGRAM(S): at 05:51

## 2020-12-25 RX ADMIN — Medication 500 MILLIGRAM(S): at 11:49

## 2020-12-25 RX ADMIN — PANTOPRAZOLE SODIUM 40 MILLIGRAM(S): 20 TABLET, DELAYED RELEASE ORAL at 11:50

## 2020-12-25 RX ADMIN — REMDESIVIR 500 MILLIGRAM(S): 5 INJECTION INTRAVENOUS at 21:14

## 2020-12-25 RX ADMIN — ENOXAPARIN SODIUM 40 MILLIGRAM(S): 100 INJECTION SUBCUTANEOUS at 11:50

## 2020-12-25 RX ADMIN — ZINC SULFATE TAB 220 MG (50 MG ZINC EQUIVALENT) 220 MILLIGRAM(S): 220 (50 ZN) TAB at 11:49

## 2020-12-25 NOTE — PROGRESS NOTE ADULT - ATTENDING COMMENTS
56 yo F with asthma, recent dx of COVID 2 weeks prior to admission, presented to [facility] with progressive worsening of breath, hypoxia, and fever. She is no longer febrile, but still requiring supplemental O2. Plan remains unchanged - She will be treated with remdesivir for 5 days or until no longer hypoxic, whichever comes sooner. Will work on weaning o2 as tolerated. She will receive treatment with dexamethasone. I will give supplemental vitamins.    Transaminitis very mild with ALT 92 and AST 47. Will cont to trend.     I have personally seen and examined patient on the above date.  I discussed the case with Michael Hebert NP, and I agree with findings and plan as detailed per note above, which I have amended where appropriate. 56 yo F with asthma, recent dx of COVID 2 weeks prior to admission, presented to [facility] with progressive worsening of breath, hypoxia, and fever. She is no longer febrile, but still requiring supplemental O2. Plan remains unchanged - She will be treated with remdesivir for 5 days or until no longer hypoxic, whichever comes sooner. Will work on weaning o2 as tolerated. She will receive treatment with dexamethasone. I will give supplemental vitamins.    Transaminitis very mild with ALT 92 and AST 47. Will cont to trend.      I have personally seen and examined patient on the above date.  I discussed the case with Michael Hebert NP, and I agree with findings and plan as detailed per note above, which I have amended where appropriate.

## 2020-12-25 NOTE — PROGRESS NOTE ADULT - PROBLEM SELECTOR PLAN 1
Acute Respiratory Failure with Hypoxia sec to COVID-19 Pneumonia- stable    Elevated LFTs likely sec to COVID  continuous 02 monitoring-  currently on 3l NC 95%sat  CTA Chest- neg for PE.   incidental finding of 8mm pulm nodule. F/u as oupt  Dexamethasone 6 mg daily   Remdevisir  Encourage for proning  Lovenox for ppx Acute Respiratory Failure with Hypoxia sec to COVID-19 Pneumonia- stable    Elevated LFTs resolving likely sec to COVID  continuous 02 monitoring-  currently on 3l NC 93%sat  CTA Chest- neg for PE.   incidental finding of 8mm pulm nodule. F/u as oupt  Dexamethasone 6 mg daily Day 3/10  Remdevisir Day 3/5  Encourage proning  Incentive spirometer   Lovenox for ppx

## 2020-12-25 NOTE — PROGRESS NOTE ADULT - SUBJECTIVE AND OBJECTIVE BOX
Patient is a 55y old  Female who presents with a chief complaint of Shortness of breath       Patient seen and examined at bedside. Pt states they feel well, denies overnight events or current complaints including chest pain, shortness of breath, dizziness, nausea, vomiting, diarrhea, fever or chills.   spO2 89% room air 93% on 3L NC.       ALLERGIES:  No Known Allergies    MEDICATIONS  (STANDING):  ascorbic acid 500 milliGRAM(s) Oral daily  cholecalciferol 1000 Unit(s) Oral daily  dexAMETHasone  Injectable 6 milliGRAM(s) IV Push daily  enoxaparin Injectable 40 milliGRAM(s) SubCutaneous daily  pantoprazole    Tablet 40 milliGRAM(s) Oral daily  remdesivir  IVPB   IV Intermittent   remdesivir  IVPB 100 milliGRAM(s) IV Intermittent every 24 hours  zinc sulfate 220 milliGRAM(s) Oral daily    MEDICATIONS  (PRN):  acetaminophen   Tablet .. 650 milliGRAM(s) Oral every 6 hours PRN Temp greater or equal to 38C (100.4F), Mild Pain (1 - 3), Moderate Pain (4 - 6)  ALBUTerol    90 MICROgram(s) HFA Inhaler 2 Puff(s) Inhalation every 6 hours PRN Shortness of Breath and/or Wheezing  benzocaine 15 mG/menthol 3.6 mG (Sugar-Free) Lozenge 1 Lozenge Oral every 4 hours PRN Sore Throat  guaiFENesin   Syrup  (Sugar-Free) 100 milliGRAM(s) Oral every 6 hours PRN Cough  ondansetron Injectable 4 milliGRAM(s) IV Push every 6 hours PRN Nausea and/or Vomiting    Vital Signs Last 24 Hrs  T(F): 99.2 (25 Dec 2020 07:51), Max: 99.2 (25 Dec 2020 07:51)  HR: 72 (25 Dec 2020 07:51) (72 - 98)  BP: 129/76 (25 Dec 2020 07:51) (129/76 - 141/79)  RR: 20 (25 Dec 2020 07:51) (18 - 20)  SpO2: 84% (25 Dec 2020 08:47) (84% - 97%)  I&O's Summary    24 Dec 2020 07:01  -  25 Dec 2020 07:00  --------------------------------------------------------  IN: 1360 mL / OUT: 0 mL / NET: 1360 mL      PHYSICAL EXAM:  General: NAD, A/O x 3  ENT: MMM, no oral thrush   Neck: Supple, No JVD  Lungs: Clear to auscultation bilaterally, non labored breathing  Cardio: RRR, S1/S2, No murmurs  Abdomen: Soft, Nontender, Nondistended; Bowel sounds present  Extremities: No calf tenderness, No pitting edema    LABS:                        11.6   9.17  )-----------( 248      ( 24 Dec 2020 06:45 )             35.6     12-25    x   |  x   |  x   ----------------------------<  x   x    |  x   |  0.89    Ca    9.1      24 Dec 2020 06:45  Mg     2.5     12-24    TPro  7.0  /  Alb  2.3  /  TBili  0.2  /  DBili  0.1  /  AST  47  /  ALT  92  /  AlkPhos  68  12-25    eGFR if Non African American: 74 mL/min/1.73M2 (12-25-20 @ 07:30)  eGFR if : 86 mL/min/1.73M2 (12-25-20 @ 07:30)              RADIOLOGY & ADDITIONAL TESTS:    Care Discussed with Consultants/Other Providers:    Patient is a 55y old  Female who presents with a chief complaint of Shortness of breath       Patient seen and examined at bedside. Pt states they feel well, denies overnight events or current complaints including chest pain, shortness of breath, dizziness, nausea, vomiting, diarrhea, fever or chills.   spO2 89% room air 93% on 3L NC.       ALLERGIES:  No Known Allergies    MEDICATIONS  (STANDING):  ascorbic acid 500 milliGRAM(s) Oral daily  cholecalciferol 1000 Unit(s) Oral daily  dexAMETHasone  Injectable 6 milliGRAM(s) IV Push daily  enoxaparin Injectable 40 milliGRAM(s) SubCutaneous daily  pantoprazole    Tablet 40 milliGRAM(s) Oral daily  remdesivir  IVPB   IV Intermittent   remdesivir  IVPB 100 milliGRAM(s) IV Intermittent every 24 hours  zinc sulfate 220 milliGRAM(s) Oral daily    MEDICATIONS  (PRN):  acetaminophen   Tablet .. 650 milliGRAM(s) Oral every 6 hours PRN Temp greater or equal to 38C (100.4F), Mild Pain (1 - 3), Moderate Pain (4 - 6)  ALBUTerol    90 MICROgram(s) HFA Inhaler 2 Puff(s) Inhalation every 6 hours PRN Shortness of Breath and/or Wheezing  benzocaine 15 mG/menthol 3.6 mG (Sugar-Free) Lozenge 1 Lozenge Oral every 4 hours PRN Sore Throat  guaiFENesin   Syrup  (Sugar-Free) 100 milliGRAM(s) Oral every 6 hours PRN Cough  ondansetron Injectable 4 milliGRAM(s) IV Push every 6 hours PRN Nausea and/or Vomiting    Vital Signs Last 24 Hrs  T(F): 99.2 (25 Dec 2020 07:51), Max: 99.2 (25 Dec 2020 07:51)  HR: 72 (25 Dec 2020 07:51) (72 - 98)  BP: 129/76 (25 Dec 2020 07:51) (129/76 - 141/79)  RR: 20 (25 Dec 2020 07:51) (18 - 20)  SpO2: 84% (25 Dec 2020 08:47) (84% - 97%)  I&O's Summary    24 Dec 2020 07:01  -  25 Dec 2020 07:00  --------------------------------------------------------  IN: 1360 mL / OUT: 0 mL / NET: 1360 mL      PHYSICAL EXAM:  General: NAD, A/O x 3  ENT: MMM, no oral thrush   Neck: Supple, No JVD  Lungs: Clear to auscultation bilaterally, non labored breathing  Cardio: RRR, S1/S2, No murmurs  Abdomen: Soft, Nontender, Nondistended; Bowel sounds present  Extremities: No calf tenderness, No pitting edema    LABS:                        11.6   9.17  )-----------( 248      ( 24 Dec 2020 06:45 )             35.6     12-25    x   |  x   |  x   ----------------------------<  x   x    |  x   |  0.89    Ca    9.1      24 Dec 2020 06:45  Mg     2.5     12-24    TPro  7.0  /  Alb  2.3  /  TBili  0.2  /  DBili  0.1  /  AST  47  /  ALT  92  /  AlkPhos  68  12-25    eGFR if Non African American: 74 mL/min/1.73M2 (12-25-20 @ 07:30)  eGFR if : 86 mL/min/1.73M2 (12-25-20 @ 07:30)              RADIOLOGY & ADDITIONAL TESTS:  no new imaging.    Care Discussed with Consultants/Other Providers:   none

## 2020-12-25 NOTE — PROGRESS NOTE ADULT - PROBLEM SELECTOR PLAN 2
c/w dexamethasone  guaifenesin  albuterol  3 o2 via NC 95% c/w dexamethasone  guaifenesin  albuterol  3L o2 via NC 93%

## 2020-12-26 LAB
ALBUMIN SERPL ELPH-MCNC: 2.3 G/DL — LOW (ref 3.3–5)
ALP SERPL-CCNC: 67 U/L — SIGNIFICANT CHANGE UP (ref 40–120)
ALT FLD-CCNC: 100 U/L — HIGH (ref 10–45)
ANION GAP SERPL CALC-SCNC: 10 MMOL/L — SIGNIFICANT CHANGE UP (ref 5–17)
AST SERPL-CCNC: 50 U/L — HIGH (ref 10–40)
BILIRUB DIRECT SERPL-MCNC: 0.1 MG/DL — SIGNIFICANT CHANGE UP (ref 0–0.2)
BILIRUB INDIRECT FLD-MCNC: 0.2 MG/DL — SIGNIFICANT CHANGE UP (ref 0.2–1)
BILIRUB SERPL-MCNC: 0.3 MG/DL — SIGNIFICANT CHANGE UP (ref 0.2–1.2)
BUN SERPL-MCNC: 19 MG/DL — SIGNIFICANT CHANGE UP (ref 7–23)
CALCIUM SERPL-MCNC: 8.3 MG/DL — LOW (ref 8.4–10.5)
CHLORIDE SERPL-SCNC: 111 MMOL/L — HIGH (ref 96–108)
CO2 SERPL-SCNC: 25 MMOL/L — SIGNIFICANT CHANGE UP (ref 22–31)
CREAT SERPL-MCNC: 0.7 MG/DL — SIGNIFICANT CHANGE UP (ref 0.5–1.3)
CREAT SERPL-MCNC: 0.85 MG/DL — SIGNIFICANT CHANGE UP (ref 0.5–1.3)
CRP SERPL-MCNC: 2.77 MG/DL — HIGH (ref 0–0.4)
D DIMER BLD IA.RAPID-MCNC: 2279 NG/ML DDU — HIGH
FERRITIN SERPL-MCNC: 715 NG/ML — HIGH (ref 15–150)
FIBRINOGEN PPP-MCNC: 553 MG/DL — HIGH (ref 290–520)
GLUCOSE SERPL-MCNC: 76 MG/DL — SIGNIFICANT CHANGE UP (ref 70–99)
HCT VFR BLD CALC: 33 % — LOW (ref 34.5–45)
HGB BLD-MCNC: 10.4 G/DL — LOW (ref 11.5–15.5)
MCHC RBC-ENTMCNC: 29.6 PG — SIGNIFICANT CHANGE UP (ref 27–34)
MCHC RBC-ENTMCNC: 31.5 GM/DL — LOW (ref 32–36)
MCV RBC AUTO: 94 FL — SIGNIFICANT CHANGE UP (ref 80–100)
NRBC # BLD: 0 /100 WBCS — SIGNIFICANT CHANGE UP (ref 0–0)
PLATELET # BLD AUTO: 298 K/UL — SIGNIFICANT CHANGE UP (ref 150–400)
POTASSIUM SERPL-MCNC: 3.3 MMOL/L — LOW (ref 3.5–5.3)
POTASSIUM SERPL-SCNC: 3.3 MMOL/L — LOW (ref 3.5–5.3)
PROT SERPL-MCNC: 6.7 G/DL — SIGNIFICANT CHANGE UP (ref 6–8.3)
RBC # BLD: 3.51 M/UL — LOW (ref 3.8–5.2)
RBC # FLD: 12.5 % — SIGNIFICANT CHANGE UP (ref 10.3–14.5)
SODIUM SERPL-SCNC: 146 MMOL/L — HIGH (ref 135–145)
WBC # BLD: 11.48 K/UL — HIGH (ref 3.8–10.5)
WBC # FLD AUTO: 11.48 K/UL — HIGH (ref 3.8–10.5)

## 2020-12-26 PROCEDURE — 99232 SBSQ HOSP IP/OBS MODERATE 35: CPT

## 2020-12-26 RX ORDER — POTASSIUM CHLORIDE 20 MEQ
20 PACKET (EA) ORAL
Refills: 0 | Status: COMPLETED | OUTPATIENT
Start: 2020-12-26 | End: 2020-12-26

## 2020-12-26 RX ADMIN — Medication 20 MILLIEQUIVALENT(S): at 14:19

## 2020-12-26 RX ADMIN — Medication 6 MILLIGRAM(S): at 05:37

## 2020-12-26 RX ADMIN — PANTOPRAZOLE SODIUM 40 MILLIGRAM(S): 20 TABLET, DELAYED RELEASE ORAL at 11:30

## 2020-12-26 RX ADMIN — Medication 500 MILLIGRAM(S): at 11:30

## 2020-12-26 RX ADMIN — ZINC SULFATE TAB 220 MG (50 MG ZINC EQUIVALENT) 220 MILLIGRAM(S): 220 (50 ZN) TAB at 11:30

## 2020-12-26 RX ADMIN — ENOXAPARIN SODIUM 40 MILLIGRAM(S): 100 INJECTION SUBCUTANEOUS at 11:30

## 2020-12-26 RX ADMIN — REMDESIVIR 500 MILLIGRAM(S): 5 INJECTION INTRAVENOUS at 21:12

## 2020-12-26 RX ADMIN — Medication 1000 UNIT(S): at 11:31

## 2020-12-26 RX ADMIN — Medication 20 MILLIEQUIVALENT(S): at 11:30

## 2020-12-26 NOTE — PROGRESS NOTE ADULT - SUBJECTIVE AND OBJECTIVE BOX
Patient is a 55y old  Female who presents with a chief complaint of Shortness of breath       Patient seen and examined at bedside. Pt states she is feeling better, reports a little shortness of breath upon ambulating, denies overnight events or current complaints including chest pain, shortness of breath, dizziness, nausea, vomiting, diarrhea, fever or chills.  spO2 95%1L   spO2 89% room air ambulation     ALLERGIES:  No Known Allergies    MEDICATIONS  (STANDING):  ascorbic acid 500 milliGRAM(s) Oral daily  cholecalciferol 1000 Unit(s) Oral daily  dexAMETHasone  Injectable 6 milliGRAM(s) IV Push daily  enoxaparin Injectable 40 milliGRAM(s) SubCutaneous daily  pantoprazole    Tablet 40 milliGRAM(s) Oral daily  potassium chloride    Tablet ER 20 milliEquivalent(s) Oral every 2 hours  remdesivir  IVPB   IV Intermittent   remdesivir  IVPB 100 milliGRAM(s) IV Intermittent every 24 hours  zinc sulfate 220 milliGRAM(s) Oral daily    MEDICATIONS  (PRN):  acetaminophen   Tablet .. 650 milliGRAM(s) Oral every 6 hours PRN Temp greater or equal to 38C (100.4F), Mild Pain (1 - 3), Moderate Pain (4 - 6)  ALBUTerol    90 MICROgram(s) HFA Inhaler 2 Puff(s) Inhalation every 6 hours PRN Shortness of Breath and/or Wheezing  benzocaine 15 mG/menthol 3.6 mG (Sugar-Free) Lozenge 1 Lozenge Oral every 4 hours PRN Sore Throat  guaiFENesin   Syrup  (Sugar-Free) 100 milliGRAM(s) Oral every 6 hours PRN Cough  ondansetron Injectable 4 milliGRAM(s) IV Push every 6 hours PRN Nausea and/or Vomiting    Vital Signs Last 24 Hrs  T(F): 98.3 (25 Dec 2020 21:46), Max: 98.7 (25 Dec 2020 15:00)  HR: 75 (25 Dec 2020 21:46) (75 - 75)  BP: 148/86 (25 Dec 2020 21:46) (133/65 - 148/86)  RR: 16 (25 Dec 2020 21:46) (16 - 16)  SpO2: 98% (25 Dec 2020 21:46) (98% - 98%)  I&O's Summary    PHYSICAL EXAM:  General: NAD, A/O x 3  ENT: MMM, no oral thrush   Neck: Supple, No JVD  Lungs: mild crackles to left lung otherwise clear to auscultation bilaterally, non labored breathing  Cardio: RRR, S1/S2, No murmurs  Abdomen: Soft, Nontender, Nondistended; Bowel sounds present  Extremities: No calf tenderness, No pitting edema    LABS:                        10.4   11.48 )-----------( 298      ( 26 Dec 2020 07:00 )             33.0     12-26    146  |  111  |  19  ----------------------------<  76  3.3   |  25  |  0.70    Ca    8.3      26 Dec 2020 07:00  Mg     2.5     12-24    TPro  6.7  /  Alb  2.3  /  TBili  0.3  /  DBili  0.1  /  AST  50  /  ALT  100  /  AlkPhos  67  12-26    eGFR if Non African American: 99 mL/min/1.73M2 (12-26-20 @ 07:00)  eGFR if African American: 115 mL/min/1.73M2 (12-26-20 @ 07:00)              RADIOLOGY & ADDITIONAL TESTS:    Care Discussed with Consultants/Other Providers:

## 2020-12-26 NOTE — PROGRESS NOTE ADULT - ATTENDING COMMENTS
COVID-19 pneumonia - still completing course of remdesivir inpatient. Stable for likely discharge after she completes this course. May need O2 on discharge. Continue decadron. I have personally seen and examined patient on the above date.  I discussed the case with Michael Hebert NP and I agree with findings and plan as detailed per note above, which I have amended where appropriate.

## 2020-12-26 NOTE — PROGRESS NOTE ADULT - PROBLEM SELECTOR PLAN 1
Acute Respiratory Failure with Hypoxia sec to COVID-19 Pneumonia- stable    Elevated LFTs resolving likely sec to COVID  continuous 02 monitoring-  currently on 1L NC 95%sat, 89% room air with ambulation   CTA Chest- neg for PE.   incidental finding of 8mm pulm nodule. F/u as oupt  Dexamethasone 6 mg daily Day 4/10  Remdevisir Day 4/5  Encourage proning  Incentive spirometer   Lovenox for ppx

## 2020-12-27 ENCOUNTER — TRANSCRIPTION ENCOUNTER (OUTPATIENT)
Age: 55
End: 2020-12-27

## 2020-12-27 LAB
ALBUMIN SERPL ELPH-MCNC: 2.1 G/DL — LOW (ref 3.3–5)
ALP SERPL-CCNC: 69 U/L — SIGNIFICANT CHANGE UP (ref 40–120)
ALT FLD-CCNC: 138 U/L — HIGH (ref 10–45)
ANION GAP SERPL CALC-SCNC: 8 MMOL/L — SIGNIFICANT CHANGE UP (ref 5–17)
AST SERPL-CCNC: 54 U/L — HIGH (ref 10–40)
BILIRUB DIRECT SERPL-MCNC: 0 MG/DL — SIGNIFICANT CHANGE UP (ref 0–0.2)
BILIRUB INDIRECT FLD-MCNC: 0.2 MG/DL — SIGNIFICANT CHANGE UP (ref 0.2–1)
BILIRUB SERPL-MCNC: 0.2 MG/DL — SIGNIFICANT CHANGE UP (ref 0.2–1.2)
BUN SERPL-MCNC: 21 MG/DL — SIGNIFICANT CHANGE UP (ref 7–23)
CALCIUM SERPL-MCNC: 8.2 MG/DL — LOW (ref 8.4–10.5)
CHLORIDE SERPL-SCNC: 110 MMOL/L — HIGH (ref 96–108)
CO2 SERPL-SCNC: 25 MMOL/L — SIGNIFICANT CHANGE UP (ref 22–31)
CREAT SERPL-MCNC: 0.81 MG/DL — SIGNIFICANT CHANGE UP (ref 0.5–1.3)
GLUCOSE SERPL-MCNC: 76 MG/DL — SIGNIFICANT CHANGE UP (ref 70–99)
HCT VFR BLD CALC: 32.1 % — LOW (ref 34.5–45)
HGB BLD-MCNC: 10.5 G/DL — LOW (ref 11.5–15.5)
MCHC RBC-ENTMCNC: 30.9 PG — SIGNIFICANT CHANGE UP (ref 27–34)
MCHC RBC-ENTMCNC: 32.7 GM/DL — SIGNIFICANT CHANGE UP (ref 32–36)
MCV RBC AUTO: 94.4 FL — SIGNIFICANT CHANGE UP (ref 80–100)
NRBC # BLD: 0 /100 WBCS — SIGNIFICANT CHANGE UP (ref 0–0)
PLATELET # BLD AUTO: 297 K/UL — SIGNIFICANT CHANGE UP (ref 150–400)
POTASSIUM SERPL-MCNC: 4.1 MMOL/L — SIGNIFICANT CHANGE UP (ref 3.5–5.3)
POTASSIUM SERPL-SCNC: 4.1 MMOL/L — SIGNIFICANT CHANGE UP (ref 3.5–5.3)
PROT SERPL-MCNC: 6.3 G/DL — SIGNIFICANT CHANGE UP (ref 6–8.3)
RBC # BLD: 3.4 M/UL — LOW (ref 3.8–5.2)
RBC # FLD: 12.2 % — SIGNIFICANT CHANGE UP (ref 10.3–14.5)
SODIUM SERPL-SCNC: 143 MMOL/L — SIGNIFICANT CHANGE UP (ref 135–145)
WBC # BLD: 11.06 K/UL — HIGH (ref 3.8–10.5)
WBC # FLD AUTO: 11.06 K/UL — HIGH (ref 3.8–10.5)

## 2020-12-27 PROCEDURE — 99232 SBSQ HOSP IP/OBS MODERATE 35: CPT

## 2020-12-27 RX ADMIN — PANTOPRAZOLE SODIUM 40 MILLIGRAM(S): 20 TABLET, DELAYED RELEASE ORAL at 13:18

## 2020-12-27 RX ADMIN — Medication 500 MILLIGRAM(S): at 13:18

## 2020-12-27 RX ADMIN — Medication 100 MILLIGRAM(S): at 22:58

## 2020-12-27 RX ADMIN — Medication 1000 UNIT(S): at 13:18

## 2020-12-27 RX ADMIN — ZINC SULFATE TAB 220 MG (50 MG ZINC EQUIVALENT) 220 MILLIGRAM(S): 220 (50 ZN) TAB at 13:18

## 2020-12-27 RX ADMIN — ENOXAPARIN SODIUM 40 MILLIGRAM(S): 100 INJECTION SUBCUTANEOUS at 13:18

## 2020-12-27 RX ADMIN — REMDESIVIR 500 MILLIGRAM(S): 5 INJECTION INTRAVENOUS at 22:00

## 2020-12-27 RX ADMIN — Medication 6 MILLIGRAM(S): at 05:42

## 2020-12-27 NOTE — PROGRESS NOTE ADULT - SUBJECTIVE AND OBJECTIVE BOX
Patient is a 55y old  Female who presents with a chief complaint of Shortness of breath (26 Dec 2020 11:20)      Patient seen and examined at bedside.    ALLERGIES:  No Known Allergies    MEDICATIONS  (STANDING):  ascorbic acid 500 milliGRAM(s) Oral daily  cholecalciferol 1000 Unit(s) Oral daily  dexAMETHasone  Injectable 6 milliGRAM(s) IV Push daily  enoxaparin Injectable 40 milliGRAM(s) SubCutaneous daily  pantoprazole    Tablet 40 milliGRAM(s) Oral daily  remdesivir  IVPB   IV Intermittent   remdesivir  IVPB 100 milliGRAM(s) IV Intermittent every 24 hours  zinc sulfate 220 milliGRAM(s) Oral daily    MEDICATIONS  (PRN):  acetaminophen   Tablet .. 650 milliGRAM(s) Oral every 6 hours PRN Temp greater or equal to 38C (100.4F), Mild Pain (1 - 3), Moderate Pain (4 - 6)  ALBUTerol    90 MICROgram(s) HFA Inhaler 2 Puff(s) Inhalation every 6 hours PRN Shortness of Breath and/or Wheezing  benzocaine 15 mG/menthol 3.6 mG (Sugar-Free) Lozenge 1 Lozenge Oral every 4 hours PRN Sore Throat  guaiFENesin   Syrup  (Sugar-Free) 100 milliGRAM(s) Oral every 6 hours PRN Cough  ondansetron Injectable 4 milliGRAM(s) IV Push every 6 hours PRN Nausea and/or Vomiting    Vital Signs Last 24 Hrs  T(F): 98.1 (26 Dec 2020 21:42), Max: 98.4 (26 Dec 2020 15:34)  HR: 96 (26 Dec 2020 21:42) (79 - 96)  BP: 141/73 (26 Dec 2020 21:42) (127/57 - 141/73)  RR: 16 (26 Dec 2020 21:42) (16 - 17)  SpO2: 94% (26 Dec 2020 21:42) (94% - 94%)  I&O's Summary    PHYSICAL EXAM:  General: NAD, A/O x 3  ENT: MMM, no oral thrush   Neck: Supple, No JVD  Lungs: Clear to auscultation bilaterally, non labored breathing  Cardio: RRR, S1/S2, No murmurs  Abdomen: Soft, Nontender, Nondistended; Bowel sounds present  Extremities: No calf tenderness, No pitting edema    LABS:                        10.5   11.06 )-----------( 297      ( 27 Dec 2020 05:55 )             32.1     12-27    143  |  110  |  21  ----------------------------<  76  4.1   |  25  |  0.81    Ca    8.2      27 Dec 2020 05:55    TPro  6.3  /  Alb  2.1  /  TBili  0.2  /  DBili  0.0  /  AST  54  /  ALT  138  /  AlkPhos  69  12-27    eGFR if Non African American: 83 mL/min/1.73M2 (12-27-20 @ 05:55)  eGFR if African American: 97 mL/min/1.73M2 (12-27-20 @ 05:55)                      RADIOLOGY & ADDITIONAL TESTS:    Care Discussed with Consultants/Other Providers:    Patient is a 55y old  Female who presents with a chief complaint of Shortness of breath (26 Dec 2020 11:20)    Patient seen and examined at bedside. Pt states continue to feel better with mild SOB on exertion, denies overnight events or current complaints including chest pain, shortness of breath, dizziness, nausea, vomiting, diarrhea, fever or chills.  spO2 95% 2L NC      ALLERGIES:  No Known Allergies    MEDICATIONS  (STANDING):  ascorbic acid 500 milliGRAM(s) Oral daily  cholecalciferol 1000 Unit(s) Oral daily  dexAMETHasone  Injectable 6 milliGRAM(s) IV Push daily  enoxaparin Injectable 40 milliGRAM(s) SubCutaneous daily  pantoprazole    Tablet 40 milliGRAM(s) Oral daily  remdesivir  IVPB   IV Intermittent   remdesivir  IVPB 100 milliGRAM(s) IV Intermittent every 24 hours  zinc sulfate 220 milliGRAM(s) Oral daily    MEDICATIONS  (PRN):  acetaminophen   Tablet .. 650 milliGRAM(s) Oral every 6 hours PRN Temp greater or equal to 38C (100.4F), Mild Pain (1 - 3), Moderate Pain (4 - 6)  ALBUTerol    90 MICROgram(s) HFA Inhaler 2 Puff(s) Inhalation every 6 hours PRN Shortness of Breath and/or Wheezing  benzocaine 15 mG/menthol 3.6 mG (Sugar-Free) Lozenge 1 Lozenge Oral every 4 hours PRN Sore Throat  guaiFENesin   Syrup  (Sugar-Free) 100 milliGRAM(s) Oral every 6 hours PRN Cough  ondansetron Injectable 4 milliGRAM(s) IV Push every 6 hours PRN Nausea and/or Vomiting    Vital Signs Last 24 Hrs  T(F): 98.1 (26 Dec 2020 21:42), Max: 98.4 (26 Dec 2020 15:34)  HR: 96 (26 Dec 2020 21:42) (79 - 96)  BP: 141/73 (26 Dec 2020 21:42) (127/57 - 141/73)  RR: 16 (26 Dec 2020 21:42) (16 - 17)  SpO2: 94% (26 Dec 2020 21:42) (94% - 94%)  I&O's Summary    PHYSICAL EXAM:  General: NAD, A/O x 3  ENT: MMM, no oral thrush   Neck: Supple, No JVD  Lungs: bilateral lower lobe crackles, non labored breathing  Cardio: RRR, S1/S2, No murmurs  Abdomen: Soft, Nontender, Nondistended; Bowel sounds present  Extremities: No calf tenderness, No pitting edema    LABS:                        10.5   11.06 )-----------( 297      ( 27 Dec 2020 05:55 )             32.1     12-27    143  |  110  |  21  ----------------------------<  76  4.1   |  25  |  0.81    Ca    8.2      27 Dec 2020 05:55    TPro  6.3  /  Alb  2.1  /  TBili  0.2  /  DBili  0.0  /  AST  54  /  ALT  138  /  AlkPhos  69  12-27    eGFR if Non African American: 83 mL/min/1.73M2 (12-27-20 @ 05:55)  eGFR if African American: 97 mL/min/1.73M2 (12-27-20 @ 05:55)                      RADIOLOGY & ADDITIONAL TESTS:    Care Discussed with Consultants/Other Providers:    Patient is a 55y old  Female who presents with a chief complaint of Shortness of breath (26 Dec 2020 11:20)    Patient seen and examined at bedside. Pt states continue to feel better with mild SOB on exertion, denies overnight events or current complaints including chest pain, shortness of breath, dizziness, nausea, vomiting, diarrhea, fever or chills.  spO2 95% 2L NC      ALLERGIES:  No Known Allergies    MEDICATIONS  (STANDING):  ascorbic acid 500 milliGRAM(s) Oral daily  cholecalciferol 1000 Unit(s) Oral daily  dexAMETHasone  Injectable 6 milliGRAM(s) IV Push daily  enoxaparin Injectable 40 milliGRAM(s) SubCutaneous daily  pantoprazole    Tablet 40 milliGRAM(s) Oral daily  remdesivir  IVPB   IV Intermittent   remdesivir  IVPB 100 milliGRAM(s) IV Intermittent every 24 hours  zinc sulfate 220 milliGRAM(s) Oral daily    MEDICATIONS  (PRN):  acetaminophen   Tablet .. 650 milliGRAM(s) Oral every 6 hours PRN Temp greater or equal to 38C (100.4F), Mild Pain (1 - 3), Moderate Pain (4 - 6)  ALBUTerol    90 MICROgram(s) HFA Inhaler 2 Puff(s) Inhalation every 6 hours PRN Shortness of Breath and/or Wheezing  benzocaine 15 mG/menthol 3.6 mG (Sugar-Free) Lozenge 1 Lozenge Oral every 4 hours PRN Sore Throat  guaiFENesin   Syrup  (Sugar-Free) 100 milliGRAM(s) Oral every 6 hours PRN Cough  ondansetron Injectable 4 milliGRAM(s) IV Push every 6 hours PRN Nausea and/or Vomiting    Vital Signs Last 24 Hrs  T(F): 98.1 (26 Dec 2020 21:42), Max: 98.4 (26 Dec 2020 15:34)  HR: 96 (26 Dec 2020 21:42) (79 - 96)  BP: 141/73 (26 Dec 2020 21:42) (127/57 - 141/73)  RR: 16 (26 Dec 2020 21:42) (16 - 17)  SpO2: 94% (26 Dec 2020 21:42) (94% - 94%)  I&O's Summary    PHYSICAL EXAM:  General: NAD, A/O x 3  ENT: MMM, no oral thrush   Neck: Supple, No JVD  Lungs: bilateral lower lobe crackles, non labored breathing  Cardio: RRR, S1/S2, No murmurs  Abdomen: Soft, Nontender, Nondistended; Bowel sounds present  Extremities: No calf tenderness, No pitting edema    LABS:                        10.5   11.06 )-----------( 297      ( 27 Dec 2020 05:55 )             32.1     12-27    143  |  110  |  21  ----------------------------<  76  4.1   |  25  |  0.81    Ca    8.2      27 Dec 2020 05:55    TPro  6.3  /  Alb  2.1  /  TBili  0.2  /  DBili  0.0  /  AST  54  /  ALT  138  /  AlkPhos  69  12-27    eGFR if Non African American: 83 mL/min/1.73M2 (12-27-20 @ 05:55)  eGFR if African American: 97 mL/min/1.73M2 (12-27-20 @ 05:55)    No new imaging.

## 2020-12-27 NOTE — DISCHARGE NOTE PROVIDER - HOSPITAL COURSE
55 years old female with PMH of Asthma  who presented with SOB, admitted for Acute Respiratory Failure with Hypoxia sec to to COVID-19 Pneumonia. Pt required supplemental oxygen via nasal cannula during stay. Completed 5 days of Remdesivir and 5 days of decadron. LFT's and inflammatory markers elevated during admission secondary to COVID.   SpO2 [id number]                    Source of Infection: COVID 19  Antibiotic / Last Day: 12/27/20    Palliative Care / Advanced Care Planning  Code Status: Full   Patient/Family agreeable to Hospice/Palliative (Y/N)?  Summary of Goals of Care Conversation:    Discharging Provider:  Michael Hebert NP  Contact Info: [phone]. Please call with any questions or concerns.    Outpatient Provider: None    Signout given to  SNF Provider:       [age] years old female with PMH of Asthma  who presented with SOB, admitted for Acute Respiratory Failure with Hypoxia sec to to COVID-19 Pneumonia. Pt required supplemental oxygen via nasal cannula during stay. Completed 5 days of Remdesivir and 5 days of decadron.    55 female with asthma, presented with sob found with elevated LFTs admitted with Covid pneumonia with hypoxia.    # Covid Pneumonia  # Acute Hypoxic Respiratory Failure  # Elevated D-Dimer  Covid pcr positive on 12/23/20  hypoxia improved, currently oxygen sats > 90% without oxygen (room air)  pt completed five days of remdesivir  currently on decadron 6mg daily - day 6/10  ruled out for PE during admission, CTA negative  plan to obtain oxygen sats on room air, on oxygen and after ambulating today  dc planning with or without oxygen, stable respiratory status    # Asthma  chronic condition - stable  continue albuterol    # Elevated LFTs  AST 28 ALT 95 - likely secondary to Covid  downtrending, stable    # Dispo  dc planning with or without oxygen    Discharging Provider: Francisco Javier Mckinley NP  Contact Info: [phone]. Please call with any questions or concerns.    Outpatient Provider: None,   Pt to follow up with family practice as outpatient            55 years old female with PMH of Asthma  who presented with SOB, admitted for Acute Respiratory Failure with Hypoxia sec to to COVID-19 Pneumonia. Pt required supplemental oxygen via nasal cannula during stay. Completed 5 days of Remdesivir and 5 days of decadron.    # Covid-19 viral Pneumonia  # Acute Hypoxic Respiratory Failure  -resolved  # Elevated D-Dimer  Covid pcr positive on 12/23/20  hypoxia improved, currently oxygen sats > 90% without oxygen (room air)  pt completed five days of remdesivir and decadron.  ruled out for PE during admission, CTA negative    # Asthma  chronic condition - stable  continue albuterol    # Elevated LFTs  AST 28 ALT 95 - likely secondary to Covid  downtrending, stable    # Pulmonary nodule  - found incidentally on imaging. This should be followed up as an outpatient.     Discharging Provider: Francisco Javier Mckinley NP, Medina Valerio MD  Contact Info: [phone]. Please call with any questions or concerns.    Outpatient Provider: None,   Pt to follow up with family practice as outpatient    Attending attestation:  I have personally seen and examined patient on the above date.  I discussed the case with Francisco Javier Mckinley and I agree with findings and plan as detailed per note above, which I have amended where appropriate.

## 2020-12-27 NOTE — PROVIDER CONTACT NOTE (OTHER) - ASSESSMENT
Pt sat down and is eating breakfast. Pt alert and no signs of respiratory distress. Rechecked pulse ox and now sating 96% on 3L NC.

## 2020-12-27 NOTE — DISCHARGE NOTE PROVIDER - NSDCFUADDINST_GEN_ALL_CORE_FT
You were additionally found to have a pulmonary nodule on the CT scan performed on your lungs. You need to have this followed by your primary care doctor.

## 2020-12-27 NOTE — DISCHARGE NOTE PROVIDER - NSDCMRMEDTOKEN_GEN_ALL_CORE_FT
albuterol 0.63 mg/3 mL (0.021%) inhalation solution: 3 milliliter(s) inhaled 4 times a day, As Needed  albuterol 90 mcg/inh inhalation aerosol: 2 puff(s) inhaled every 6 hours, As Needed  fluticasone 50 mcg/inh inhalation powder: 1 puff(s) inhaled 2 times a day  fluticasone-salmeterol 113 mcg-14 mcg/inh inhalation powder: 1 puff(s) inhaled 2 times a day, As Needed  guaiFENesin 100 mg/5 mL oral liquid: 5 milliliter(s) orally every 6 hours, As needed, Cough  Saline Nasal Mist 0.65% nasal solution: 2 drop(s) nasal every 6 hours, As Needed

## 2020-12-27 NOTE — DISCHARGE NOTE PROVIDER - CARE PROVIDER_API CALL
Juan Lucero)  Family Medicine  [address] [place] [address]  Phone: [phone]  Fax: [phone]  Follow Up Time:

## 2020-12-27 NOTE — PROGRESS NOTE ADULT - PROBLEM SELECTOR PLAN 1
Acute Respiratory Failure with Hypoxia sec to COVID-19 Pneumonia- stable    Elevated LFTs resolving likely sec to COVID  continuous 02 monitoring-  currently on 1L NC 95%sat, 89% room air with ambulation   CTA Chest- neg for PE.   incidental finding of 8mm pulm nodule. F/u as oupt  Dexamethasone 6 mg daily Day 5/10  Remdevisir Day 5/5  Encourage proning  Incentive spirometer   Lovenox for ppx Acute Respiratory Failure with Hypoxia sec to COVID-19 Pneumonia- stable    Elevated LFTs resolving likely sec to COVID  continuous 02 monitoring-  currently on 2L NC 95%sat, desat to upper 80s room air with ambulation   CTA Chest- neg for PE.   incidental finding of 8mm pulm nodule. F/u as oupt  Dexamethasone 6 mg daily Day 5/10  Remdevisir Day 5/5  Encourage proning  Incentive spirometer   Lovenox for ppx

## 2020-12-27 NOTE — PROGRESS NOTE ADULT - ASSESSMENT
55 years old female with PMH of Asthma  who p/w SOB. admitted for Acute Respiratory Failure with Hypoxia sec to to COVID-19 Pneumonia, Elevated LFT's likely sec to COVID             55 years old female with PMH of Asthma  who p/w SOB. admitted for Acute Respiratory Failure with Hypoxia sec to to COVID-19 Pneumonia, Elevated LFT's likely sec to COVID      spO2 95% 2L NC

## 2020-12-27 NOTE — DISCHARGE NOTE PROVIDER - NSDCCPCAREPLAN_GEN_ALL_CORE_FT
PRINCIPAL DISCHARGE DIAGNOSIS  Diagnosis: Hypoxia  Assessment and Plan of Treatment: You had low oxygen levels due to COVID 19 infection. You completed 5 days of Remdesivir, steroids and vitamins to help fight the virus. You required supplemental oxygen support which you now no longer need. Eat a healthy diet including fruits and vegetables. Return to the ED for worsening shortness of breath and or fever unrelieved by Tylenol. Stay in quarantine for at least 72 hours after your last symptoms (ex. cough, shortness of breath, fever). Follow up with your primary care provider in the next 1-2 weeks.

## 2020-12-28 ENCOUNTER — TRANSCRIPTION ENCOUNTER (OUTPATIENT)
Age: 55
End: 2020-12-28

## 2020-12-28 VITALS
DIASTOLIC BLOOD PRESSURE: 79 MMHG | RESPIRATION RATE: 20 BRPM | TEMPERATURE: 99 F | OXYGEN SATURATION: 96 % | HEART RATE: 97 BPM | SYSTOLIC BLOOD PRESSURE: 141 MMHG

## 2020-12-28 LAB
ALBUMIN SERPL ELPH-MCNC: 2.1 G/DL — LOW (ref 3.3–5)
ALP SERPL-CCNC: 72 U/L — SIGNIFICANT CHANGE UP (ref 40–120)
ALT FLD-CCNC: 95 U/L — HIGH (ref 10–45)
AST SERPL-CCNC: 28 U/L — SIGNIFICANT CHANGE UP (ref 10–40)
BILIRUB DIRECT SERPL-MCNC: 0.1 MG/DL — SIGNIFICANT CHANGE UP (ref 0–0.2)
BILIRUB INDIRECT FLD-MCNC: 0.1 MG/DL — LOW (ref 0.2–1)
BILIRUB SERPL-MCNC: 0.2 MG/DL — SIGNIFICANT CHANGE UP (ref 0.2–1.2)
CREAT SERPL-MCNC: 0.68 MG/DL — SIGNIFICANT CHANGE UP (ref 0.5–1.3)
PROT SERPL-MCNC: 6.3 G/DL — SIGNIFICANT CHANGE UP (ref 6–8.3)

## 2020-12-28 PROCEDURE — 85384 FIBRINOGEN ACTIVITY: CPT

## 2020-12-28 PROCEDURE — 80053 COMPREHEN METABOLIC PANEL: CPT

## 2020-12-28 PROCEDURE — 36415 COLL VENOUS BLD VENIPUNCTURE: CPT

## 2020-12-28 PROCEDURE — 80076 HEPATIC FUNCTION PANEL: CPT

## 2020-12-28 PROCEDURE — 85025 COMPLETE CBC W/AUTO DIFF WBC: CPT

## 2020-12-28 PROCEDURE — 86140 C-REACTIVE PROTEIN: CPT

## 2020-12-28 PROCEDURE — 82728 ASSAY OF FERRITIN: CPT

## 2020-12-28 PROCEDURE — 80048 BASIC METABOLIC PNL TOTAL CA: CPT

## 2020-12-28 PROCEDURE — 83735 ASSAY OF MAGNESIUM: CPT

## 2020-12-28 PROCEDURE — 99239 HOSP IP/OBS DSCHRG MGMT >30: CPT

## 2020-12-28 PROCEDURE — 82565 ASSAY OF CREATININE: CPT

## 2020-12-28 PROCEDURE — 85027 COMPLETE CBC AUTOMATED: CPT

## 2020-12-28 PROCEDURE — 85379 FIBRIN DEGRADATION QUANT: CPT

## 2020-12-28 RX ADMIN — Medication 1000 UNIT(S): at 12:07

## 2020-12-28 RX ADMIN — ZINC SULFATE TAB 220 MG (50 MG ZINC EQUIVALENT) 220 MILLIGRAM(S): 220 (50 ZN) TAB at 12:07

## 2020-12-28 RX ADMIN — Medication 6 MILLIGRAM(S): at 05:35

## 2020-12-28 RX ADMIN — PANTOPRAZOLE SODIUM 40 MILLIGRAM(S): 20 TABLET, DELAYED RELEASE ORAL at 12:08

## 2020-12-28 RX ADMIN — Medication 100 MILLIGRAM(S): at 12:08

## 2020-12-28 RX ADMIN — ENOXAPARIN SODIUM 40 MILLIGRAM(S): 100 INJECTION SUBCUTANEOUS at 12:08

## 2020-12-28 RX ADMIN — Medication 500 MILLIGRAM(S): at 12:08

## 2020-12-28 NOTE — PROGRESS NOTE ADULT - SUBJECTIVE AND OBJECTIVE BOX
Patient is a 55y old  Female who presents with a chief complaint of Shortness of breath (27 Dec 2020 16:41)    Patient seen and examined at bedside.  no acute overnight events    ALLERGIES:  No Known Allergies        Vital Signs Last 24 Hrs  T(F): 98.3 (28 Dec 2020 08:08), Max: 98.4 (27 Dec 2020 21:30)  HR: 76 (28 Dec 2020 08:08) (76 - 88)  BP: 116/75 (28 Dec 2020 08:08) (116/75 - 141/78)  RR: 22 (28 Dec 2020 08:08) (18 - 22)  SpO2: 96% (28 Dec 2020 08:08) (93% - 98%)  I&O's Summary    27 Dec 2020 07:01  -  28 Dec 2020 07:00  --------------------------------------------------------  IN: 800 mL / OUT: 0 mL / NET: 800 mL      MEDICATIONS:  acetaminophen   Tablet .. 650 milliGRAM(s) Oral every 6 hours PRN  ALBUTerol    90 MICROgram(s) HFA Inhaler 2 Puff(s) Inhalation every 6 hours PRN  ascorbic acid 500 milliGRAM(s) Oral daily  benzocaine 15 mG/menthol 3.6 mG (Sugar-Free) Lozenge 1 Lozenge Oral every 4 hours PRN  cholecalciferol 1000 Unit(s) Oral daily  dexAMETHasone  Injectable 6 milliGRAM(s) IV Push daily  enoxaparin Injectable 40 milliGRAM(s) SubCutaneous daily  guaiFENesin   Syrup  (Sugar-Free) 100 milliGRAM(s) Oral every 6 hours PRN  ondansetron Injectable 4 milliGRAM(s) IV Push every 6 hours PRN  pantoprazole    Tablet 40 milliGRAM(s) Oral daily  zinc sulfate 220 milliGRAM(s) Oral daily      PHYSICAL EXAM:  General: NAD, A/O x 3  ENT: MMM, no oral thrush  Neck: Supple, No JVD  Lungs: Clear to auscultation bilaterally, non labored breathing  Cardio: S1S2 regular  Abdomen: Soft, Nontender  Extremities: No cyanosis, No edema    LABS:                        10.5   11.06 )-----------( 297      ( 27 Dec 2020 05:55 )             32.1     12-28    x   |  x   |  x   ----------------------------<  x   x    |  x   |  0.68    Ca    8.2      27 Dec 2020 05:55    TPro  6.3  /  Alb  2.1  /  TBili  0.2  /  DBili  0.1  /  AST  28  /  ALT  95  /  AlkPhos  72  12-28    eGFR if Non African American: 100 mL/min/1.73M2 (12-28-20 @ 08:42)  eGFR if : 116 mL/min/1.73M2 (12-28-20 @ 08:42)                                    RADIOLOGY & ADDITIONAL TESTS:    Care Discussed with Consultants/Other Providers:    Patient is a 55y old  Female who presents with a chief complaint of Shortness of breath (27 Dec 2020 16:41)    Patient seen and examined at bedside.  no acute overnight events    ALLERGIES:  No Known Allergies    Vital Signs Last 24 Hrs  T(F): 98.3 (28 Dec 2020 08:08), Max: 98.4 (27 Dec 2020 21:30)  HR: 76 (28 Dec 2020 08:08) (76 - 88)  BP: 116/75 (28 Dec 2020 08:08) (116/75 - 141/78)  RR: 22 (28 Dec 2020 08:08) (18 - 22)  SpO2: 96% (28 Dec 2020 08:08) (93% - 98%)  I&O's Summary    27 Dec 2020 07:01  -  28 Dec 2020 07:00  --------------------------------------------------------  IN: 800 mL / OUT: 0 mL / NET: 800 mL      MEDICATIONS:  acetaminophen   Tablet .. 650 milliGRAM(s) Oral every 6 hours PRN  ALBUTerol    90 MICROgram(s) HFA Inhaler 2 Puff(s) Inhalation every 6 hours PRN  ascorbic acid 500 milliGRAM(s) Oral daily  benzocaine 15 mG/menthol 3.6 mG (Sugar-Free) Lozenge 1 Lozenge Oral every 4 hours PRN  cholecalciferol 1000 Unit(s) Oral daily  dexAMETHasone  Injectable 6 milliGRAM(s) IV Push daily  enoxaparin Injectable 40 milliGRAM(s) SubCutaneous daily  guaiFENesin   Syrup  (Sugar-Free) 100 milliGRAM(s) Oral every 6 hours PRN  ondansetron Injectable 4 milliGRAM(s) IV Push every 6 hours PRN  pantoprazole    Tablet 40 milliGRAM(s) Oral daily  zinc sulfate 220 milliGRAM(s) Oral daily      PHYSICAL EXAM:  General: NAD, A/O x 3  ENT: MMM, no oral thrush  Neck: Supple, No JVD  Lungs: Clear to auscultation bilaterally, non labored breathing  Cardio: S1S2 regular  Abdomen: Soft, Nontender  Extremities: No cyanosis, No edema    LABS:                        10.5   11.06 )-----------( 297      ( 27 Dec 2020 05:55 )             32.1     12-28    x   |  x   |  x   ----------------------------<  x   x    |  x   |  0.68    Ca    8.2      27 Dec 2020 05:55    TPro  6.3  /  Alb  2.1  /  TBili  0.2  /  DBili  0.1  /  AST  28  /  ALT  95  /  AlkPhos  72  12-28    eGFR if Non African American: 100 mL/min/1.73M2 (12-28-20 @ 08:42)  eGFR if : 116 mL/min/1.73M2 (12-28-20 @ 08:42)    RADIOLOGY & ADDITIONAL TESTS:  no new imaging

## 2020-12-28 NOTE — DISCHARGE NOTE NURSING/CASE MANAGEMENT/SOCIAL WORK - NSDCFUADDAPPT_GEN_ALL_CORE_FT
PCP : Dr. Lima Case [phone]  follow up appointment on 12/30/20 at 1pm  ... Telehealth appointment.

## 2020-12-28 NOTE — PROGRESS NOTE ADULT - ASSESSMENT
55 female with asthma, presented with sob found with elevated LFTs admitted with Covid pneumonia with hypoxia.    # Covid Pneumonia  # Acute Hypoxic Respiratory Failure  # Elevated D-Dimer  Covid pcr positive on 12/23/20  hypoxia improved, currently oxygen sats > 90% without oxygen (room air)  pt completed five days of remdesivir  currently on decadron 6mg daily - day 6/10  ruled out for PE during admission, CTA negative  plan to obtain oxygen sats on room air, on oxygen and after ambulating today  dc planning with or without oxygen, stable respiratory status    # Asthma  chronic condition - stable  continue albuterol    # Elevated LFTs  AST 28 ALT 95 - likely secondary to Covid  downtrending, stable    # Dispo  dc planning with or without oxygen     55 female with asthma, presented with sob found with elevated LFTs admitted with Covid-19 pneumonia with hypoxia.    # Covid Pneumonia  # Acute Hypoxic Respiratory Failure  -resolved  # Elevated D-Dimer  Covid pcr positive on 12/23/20  hypoxia improved, currently oxygen sats > 90% without oxygen (room air)  pt completed five days of remdesivir  currently on decadron 6mg daily - day 6/10  ruled out for PE during admission, CTA negative  plan to obtain oxygen sats on room air, on oxygen and after ambulating today  dc planning with or without oxygen, stable respiratory status    # Asthma  chronic condition - stable  continue albuterol    # Elevated LFTs  AST 28 ALT 95 - likely secondary to Covid  downtrending, stable    # Pulmonary nodule  - found incidentally on imaging. This should be followed up as an outpatient.     # Dispo  patient is stable on room air at time of discharge.

## 2020-12-28 NOTE — CHART NOTE - NSCHARTNOTEFT_GEN_A_CORE
Nutrition Initial Assessment    Nutrition Consult Received: Yes [   ]  No [  X ]    Reason for Initial Nutrition Assessment: length of stay    Source of Information: Unable to conduct a fact to face interview due to limited contact restrictions related to pt's medical condition and isolation precautions. Information obtained from a phone call with the pt and from the EMR.    Admitting Diagnosis: 55y Female admitted for Hypoxia      PAST MEDICAL & SURGICAL HISTORY:  Arthritis    Asthma    History of  section        Subjective Information:  pt. eating well tolerates diet well. denies anuy n/v/ diarrhea. last bm was today. weight stable. no edema noted. skin intact.       GI Issues: none    Current Nutrition Order:  PO Intake:   Good (%) [   ]    Fair (50-75%) [  X ]    Poor (<50%) [   ]    Skin Integrity: Intact    Labs:   12- Nan/a   Glu n/a   K+ n/a   Cr  0.68 mg/dL BUN n/a   Phos n/a   Alb 2.1 g/dL<L> PAB n/a             Medications:  MEDICATIONS  (STANDING):  ascorbic acid 500 milliGRAM(s) Oral daily  cholecalciferol 1000 Unit(s) Oral daily  dexAMETHasone  Injectable 6 milliGRAM(s) IV Push daily  enoxaparin Injectable 40 milliGRAM(s) SubCutaneous daily  pantoprazole    Tablet 40 milliGRAM(s) Oral daily  zinc sulfate 220 milliGRAM(s) Oral daily    MEDICATIONS  (PRN):  acetaminophen   Tablet .. 650 milliGRAM(s) Oral every 6 hours PRN Temp greater or equal to 38C (100.4F), Mild Pain (1 - 3), Moderate Pain (4 - 6)  ALBUTerol    90 MICROgram(s) HFA Inhaler 2 Puff(s) Inhalation every 6 hours PRN Shortness of Breath and/or Wheezing  benzocaine 15 mG/menthol 3.6 mG (Sugar-Free) Lozenge 1 Lozenge Oral every 4 hours PRN Sore Throat  guaiFENesin   Syrup  (Sugar-Free) 100 milliGRAM(s) Oral every 6 hours PRN Cough  ondansetron Injectable 4 milliGRAM(s) IV Push every 6 hours PRN Nausea and/or Vomiting    Admitted Anthropometrics:    Height (cm): 165.1 (20 @ 20:20)  Weight (kg): 68 (20 @ 20:20)  BMI (kg/m2): 24.9 (20 @ 20:20)    Nutrition Focused Physical Exam: Unable to complete due to limited isolation contact precautions at this time.     Estimated Energy Needs (_2040_ kcal/kg- ___30 kcal/kg):   Estimated Protein Needs (__75 g/kg- _1.1__ g/kg):   Based on weight of:    [  ] Nutrition Diagnosis:  [X  ] No active nutrition diagnosis at this time  [  ] Current medical condition precludes nutrition intervention    Goal: Maintain adequate nutrition/ hydration    Nutrition Interventions:     Recommendations:      RD to follow-up per protocol. Nutrition Initial Assessment    Nutrition Consult Received: Yes [   ]  No [  X ]    Reason for Initial Nutrition Assessment: length of stay    Source of Information: Unable to conduct a fact to face interview due to limited contact restrictions related to pt's medical condition and isolation precautions. Information obtained from a phone call with the pt and from the EMR.    Admitting Diagnosis: 55y Female admitted for Hypoxia      PAST MEDICAL & SURGICAL HISTORY:  Arthritis    Asthma    History of  section        Subjective Information:  pt. eating well tolerates diet well. denies anuy n/v/ diarrhea. last bm was today. weight stable. no edema noted. skin intact.       GI Issues: none    Current Nutrition Order: regular  PO Intake:   Good (%) [   ]    Fair (50-75%) [  X ]    Poor (<50%) [   ]    Skin Integrity: Intact    Labs:   12- Nan/a   Glu n/a   K+ n/a   Cr  0.68 mg/dL BUN n/a   Phos n/a   Alb 2.1 g/dL<L> PAB n/a             Medications:  MEDICATIONS  (STANDING):  ascorbic acid 500 milliGRAM(s) Oral daily  cholecalciferol 1000 Unit(s) Oral daily  dexAMETHasone  Injectable 6 milliGRAM(s) IV Push daily  enoxaparin Injectable 40 milliGRAM(s) SubCutaneous daily  pantoprazole    Tablet 40 milliGRAM(s) Oral daily  zinc sulfate 220 milliGRAM(s) Oral daily    MEDICATIONS  (PRN):  acetaminophen   Tablet .. 650 milliGRAM(s) Oral every 6 hours PRN Temp greater or equal to 38C (100.4F), Mild Pain (1 - 3), Moderate Pain (4 - 6)  ALBUTerol    90 MICROgram(s) HFA Inhaler 2 Puff(s) Inhalation every 6 hours PRN Shortness of Breath and/or Wheezing  benzocaine 15 mG/menthol 3.6 mG (Sugar-Free) Lozenge 1 Lozenge Oral every 4 hours PRN Sore Throat  guaiFENesin   Syrup  (Sugar-Free) 100 milliGRAM(s) Oral every 6 hours PRN Cough  ondansetron Injectable 4 milliGRAM(s) IV Push every 6 hours PRN Nausea and/or Vomiting    Admitted Anthropometrics:    Height (cm): 165.1 (20 @ 20:20)  Weight (kg): 68 (20 @ 20:20)  BMI (kg/m2): 24.9 (20 @ 20:20)    Nutrition Focused Physical Exam: Unable to complete due to limited isolation contact precautions at this time.     Estimated Energy Needs (_2040_ kcal/kg- ___30 kcal/kg):   Estimated Protein Needs (__75 g/kg- _1.1__ g/kg):   Based on weight of:    [  ] Nutrition Diagnosis:  [X  ] No active nutrition diagnosis at this time  [  ] Current medical condition precludes nutrition intervention    Goal: Maintain adequate nutrition/ hydration    Nutrition Interventions:     Recommendations:      RD to follow-up per protocol.

## 2020-12-28 NOTE — PROGRESS NOTE ADULT - ATTENDING COMMENTS
COVID-19 pneumonia - patient completed 5 days of remdesivir and decadron. She is on room and satting well, with no need for supplemental oxygen on discharge. She will be sent home  by ambulette.     I have personally seen and examined patient on the above date.  I discussed the case with PHYLICIA Mckinley ACP, and I agree with findings and plan as detailed per note above, which I have amended where appropriate.     I was physically present for the key portions of the evaluation and management (E/M) service provided.  I agree with the above history, physical, and plan which I have reviewed and edited where appropriate.

## 2020-12-29 ENCOUNTER — TRANSCRIPTION ENCOUNTER (OUTPATIENT)
Age: 55
End: 2020-12-29

## 2020-12-30 ENCOUNTER — TRANSCRIPTION ENCOUNTER (OUTPATIENT)
Age: 55
End: 2020-12-30

## 2021-01-06 ENCOUNTER — TRANSCRIPTION ENCOUNTER (OUTPATIENT)
Age: 56
End: 2021-01-06

## 2021-01-07 ENCOUNTER — TRANSCRIPTION ENCOUNTER (OUTPATIENT)
Age: 56
End: 2021-01-07

## 2021-01-08 DIAGNOSIS — R91.1 SOLITARY PULMONARY NODULE: ICD-10-CM

## 2021-01-08 DIAGNOSIS — J96.01 ACUTE RESPIRATORY FAILURE WITH HYPOXIA: ICD-10-CM

## 2021-01-08 DIAGNOSIS — U07.1 COVID-19: ICD-10-CM

## 2021-01-08 DIAGNOSIS — J12.89 OTHER VIRAL PNEUMONIA: ICD-10-CM

## 2021-01-08 DIAGNOSIS — R79.89 OTHER SPECIFIED ABNORMAL FINDINGS OF BLOOD CHEMISTRY: ICD-10-CM

## 2021-01-08 DIAGNOSIS — J45.909 UNSPECIFIED ASTHMA, UNCOMPLICATED: ICD-10-CM

## 2021-02-05 ENCOUNTER — APPOINTMENT (OUTPATIENT)
Dept: RHEUMATOLOGY | Facility: CLINIC | Age: 56
End: 2021-02-05

## 2021-03-09 ENCOUNTER — APPOINTMENT (OUTPATIENT)
Dept: RHEUMATOLOGY | Facility: CLINIC | Age: 56
End: 2021-03-09
Payer: COMMERCIAL

## 2021-03-09 VITALS
DIASTOLIC BLOOD PRESSURE: 80 MMHG | TEMPERATURE: 98 F | BODY MASS INDEX: 27.97 KG/M2 | OXYGEN SATURATION: 97 % | RESPIRATION RATE: 17 BRPM | WEIGHT: 152 LBS | HEART RATE: 111 BPM | HEIGHT: 62 IN | SYSTOLIC BLOOD PRESSURE: 124 MMHG

## 2021-03-09 DIAGNOSIS — R70.0 ELEVATED ERYTHROCYTE SEDIMENTATION RATE: ICD-10-CM

## 2021-03-09 DIAGNOSIS — M79.10 MYALGIA, UNSPECIFIED SITE: ICD-10-CM

## 2021-03-09 DIAGNOSIS — M25.50 PAIN IN UNSPECIFIED JOINT: ICD-10-CM

## 2021-03-09 DIAGNOSIS — Z87.39 PERSONAL HISTORY OF OTHER DISEASES OF THE MUSCULOSKELETAL SYSTEM AND CONNECTIVE TISSUE: ICD-10-CM

## 2021-03-09 PROCEDURE — 99072 ADDL SUPL MATRL&STAF TM PHE: CPT

## 2021-03-09 PROCEDURE — 99214 OFFICE O/P EST MOD 30 MIN: CPT

## 2021-03-09 RX ORDER — PREDNISONE 5 MG/1
5 TABLET ORAL
Qty: 30 | Refills: 0 | Status: DISCONTINUED | COMMUNITY
Start: 2020-01-15 | End: 2021-03-09

## 2021-03-10 PROBLEM — R70.0 ELEVATED ERYTHROCYTE SEDIMENTATION RATE: Status: ACTIVE | Noted: 2020-01-31

## 2021-03-10 PROBLEM — Z87.39 HISTORY OF RHEUMATOID ARTHRITIS: Status: ACTIVE | Noted: 2020-01-15

## 2021-03-10 PROBLEM — M79.10 MYALGIA: Status: ACTIVE | Noted: 2020-01-15

## 2021-03-10 PROBLEM — M25.50 POLYARTHRALGIA: Status: ACTIVE | Noted: 2020-01-15

## 2021-03-10 NOTE — ASSESSMENT
[FreeTextEntry1] : 55 year old female presenting today for a f.u visit. Has hx of RA/polyarthritis to BL hands. Previously following Dr. Ty. Treated with MTX- 10mg weekly x 3 years with improvement in symptoms.  Last dose of MTX- 3 months ago.  Now on SSZ 1000mg BID with significant improvement in symptoms. Prior labs with elevated inflammatory markers, RF, CCP. +Quantiferon, +Hep Be AB, vitamin d: 17. Xray BL hands/ BL ankles/feet: unremarkable. baseline CXR: unremarkable\par \par - c/w SSZ to 1000mg BID (reviewed risk and benefits of medications)\par - Tylenol prn for pain\par - OTC topical analgesics\par - ID evaluation for latent TB. CXR: clear\par - repeat labs today\par \par Discussed treatment plan with the patient. The patient was given the opportunity to ask questions and all questions were answered to their satisfaction.

## 2021-03-28 LAB
25(OH)D3 SERPL-MCNC: 22.2 NG/ML
ALBUMIN SERPL ELPH-MCNC: 4.4 G/DL
ALP BLD-CCNC: 93 U/L
ALT SERPL-CCNC: 17 U/L
ANION GAP SERPL CALC-SCNC: 12 MMOL/L
AST SERPL-CCNC: 22 U/L
BASOPHILS # BLD AUTO: 0.08 K/UL
BASOPHILS NFR BLD AUTO: 0.9 %
BILIRUB SERPL-MCNC: 0.3 MG/DL
BUN SERPL-MCNC: 10 MG/DL
CALCIUM SERPL-MCNC: 10 MG/DL
CHLORIDE SERPL-SCNC: 106 MMOL/L
CK SERPL-CCNC: 176 U/L
CO2 SERPL-SCNC: 23 MMOL/L
CREAT SERPL-MCNC: 0.7 MG/DL
CRP SERPL-MCNC: <3 MG/L
EOSINOPHIL # BLD AUTO: 0.52 K/UL
EOSINOPHIL NFR BLD AUTO: 5.5 %
ERYTHROCYTE [SEDIMENTATION RATE] IN BLOOD BY WESTERGREN METHOD: 16 MM/HR
GLUCOSE SERPL-MCNC: 87 MG/DL
HCT VFR BLD CALC: 39.4 %
HGB BLD-MCNC: 12.9 G/DL
IMM GRANULOCYTES NFR BLD AUTO: 0.3 %
LYMPHOCYTES # BLD AUTO: 2.81 K/UL
LYMPHOCYTES NFR BLD AUTO: 30 %
MAN DIFF?: NORMAL
MCHC RBC-ENTMCNC: 31.1 PG
MCHC RBC-ENTMCNC: 32.7 GM/DL
MCV RBC AUTO: 94.9 FL
MONOCYTES # BLD AUTO: 0.72 K/UL
MONOCYTES NFR BLD AUTO: 7.7 %
NEUTROPHILS # BLD AUTO: 5.21 K/UL
NEUTROPHILS NFR BLD AUTO: 55.6 %
PLATELET # BLD AUTO: 257 K/UL
POTASSIUM SERPL-SCNC: 4 MMOL/L
PROT SERPL-MCNC: 7 G/DL
RBC # BLD: 4.15 M/UL
RBC # FLD: 13.1 %
SODIUM SERPL-SCNC: 141 MMOL/L
WBC # FLD AUTO: 9.37 K/UL

## 2021-04-30 ENCOUNTER — EMERGENCY (EMERGENCY)
Facility: HOSPITAL | Age: 56
LOS: 1 days | Discharge: HOME | End: 2021-04-30
Attending: EMERGENCY MEDICINE
Payer: COMMERCIAL

## 2021-04-30 VITALS
WEIGHT: 139.99 LBS | HEART RATE: 109 BPM | TEMPERATURE: 98 F | DIASTOLIC BLOOD PRESSURE: 76 MMHG | HEIGHT: 65 IN | OXYGEN SATURATION: 97 % | SYSTOLIC BLOOD PRESSURE: 115 MMHG | RESPIRATION RATE: 18 BRPM

## 2021-04-30 DIAGNOSIS — Z98.891 HISTORY OF UTERINE SCAR FROM PREVIOUS SURGERY: Chronic | ICD-10-CM

## 2021-04-30 LAB
ALBUMIN SERPL ELPH-MCNC: 4.3 G/DL — SIGNIFICANT CHANGE UP (ref 3.3–5.2)
ALP SERPL-CCNC: 106 U/L — SIGNIFICANT CHANGE UP (ref 40–120)
ALT FLD-CCNC: 17 U/L — SIGNIFICANT CHANGE UP
ANION GAP SERPL CALC-SCNC: 13 MMOL/L — SIGNIFICANT CHANGE UP (ref 5–17)
APTT BLD: 32.2 SEC — SIGNIFICANT CHANGE UP (ref 27.5–35.5)
AST SERPL-CCNC: 26 U/L — SIGNIFICANT CHANGE UP
BASOPHILS # BLD AUTO: 0.07 K/UL — SIGNIFICANT CHANGE UP (ref 0–0.2)
BASOPHILS NFR BLD AUTO: 0.5 % — SIGNIFICANT CHANGE UP (ref 0–2)
BILIRUB SERPL-MCNC: 0.3 MG/DL — LOW (ref 0.4–2)
BUN SERPL-MCNC: 13 MG/DL — SIGNIFICANT CHANGE UP (ref 8–20)
CALCIUM SERPL-MCNC: 9.4 MG/DL — SIGNIFICANT CHANGE UP (ref 8.6–10.2)
CHLORIDE SERPL-SCNC: 105 MMOL/L — SIGNIFICANT CHANGE UP (ref 98–107)
CO2 SERPL-SCNC: 25 MMOL/L — SIGNIFICANT CHANGE UP (ref 22–29)
CREAT SERPL-MCNC: 0.7 MG/DL — SIGNIFICANT CHANGE UP (ref 0.5–1.3)
EOSINOPHIL # BLD AUTO: 0.28 K/UL — SIGNIFICANT CHANGE UP (ref 0–0.5)
EOSINOPHIL NFR BLD AUTO: 2.2 % — SIGNIFICANT CHANGE UP (ref 0–6)
GLUCOSE SERPL-MCNC: 106 MG/DL — HIGH (ref 70–99)
HCT VFR BLD CALC: 38.2 % — SIGNIFICANT CHANGE UP (ref 34.5–45)
HGB BLD-MCNC: 12.6 G/DL — SIGNIFICANT CHANGE UP (ref 11.5–15.5)
IMM GRANULOCYTES NFR BLD AUTO: 0.5 % — SIGNIFICANT CHANGE UP (ref 0–1.5)
INR BLD: 1.13 RATIO — SIGNIFICANT CHANGE UP (ref 0.88–1.16)
LYMPHOCYTES # BLD AUTO: 16.5 % — SIGNIFICANT CHANGE UP (ref 13–44)
LYMPHOCYTES # BLD AUTO: 2.15 K/UL — SIGNIFICANT CHANGE UP (ref 1–3.3)
MCHC RBC-ENTMCNC: 30.4 PG — SIGNIFICANT CHANGE UP (ref 27–34)
MCHC RBC-ENTMCNC: 33 GM/DL — SIGNIFICANT CHANGE UP (ref 32–36)
MCV RBC AUTO: 92.3 FL — SIGNIFICANT CHANGE UP (ref 80–100)
MONOCYTES # BLD AUTO: 0.83 K/UL — SIGNIFICANT CHANGE UP (ref 0–0.9)
MONOCYTES NFR BLD AUTO: 6.4 % — SIGNIFICANT CHANGE UP (ref 2–14)
NEUTROPHILS # BLD AUTO: 9.62 K/UL — HIGH (ref 1.8–7.4)
NEUTROPHILS NFR BLD AUTO: 73.9 % — SIGNIFICANT CHANGE UP (ref 43–77)
PLATELET # BLD AUTO: 250 K/UL — SIGNIFICANT CHANGE UP (ref 150–400)
POTASSIUM SERPL-MCNC: 3.9 MMOL/L — SIGNIFICANT CHANGE UP (ref 3.5–5.3)
POTASSIUM SERPL-SCNC: 3.9 MMOL/L — SIGNIFICANT CHANGE UP (ref 3.5–5.3)
PROT SERPL-MCNC: 7.7 G/DL — SIGNIFICANT CHANGE UP (ref 6.6–8.7)
PROTHROM AB SERPL-ACNC: 13 SEC — SIGNIFICANT CHANGE UP (ref 10.6–13.6)
RBC # BLD: 4.14 M/UL — SIGNIFICANT CHANGE UP (ref 3.8–5.2)
RBC # FLD: 12.6 % — SIGNIFICANT CHANGE UP (ref 10.3–14.5)
SODIUM SERPL-SCNC: 143 MMOL/L — SIGNIFICANT CHANGE UP (ref 135–145)
WBC # BLD: 13.01 K/UL — HIGH (ref 3.8–10.5)
WBC # FLD AUTO: 13.01 K/UL — HIGH (ref 3.8–10.5)

## 2021-04-30 PROCEDURE — 85025 COMPLETE CBC W/AUTO DIFF WBC: CPT

## 2021-04-30 PROCEDURE — 85610 PROTHROMBIN TIME: CPT

## 2021-04-30 PROCEDURE — 36415 COLL VENOUS BLD VENIPUNCTURE: CPT

## 2021-04-30 PROCEDURE — 96375 TX/PRO/DX INJ NEW DRUG ADDON: CPT | Mod: XU

## 2021-04-30 PROCEDURE — 73590 X-RAY EXAM OF LOWER LEG: CPT

## 2021-04-30 PROCEDURE — 85730 THROMBOPLASTIN TIME PARTIAL: CPT

## 2021-04-30 PROCEDURE — 80053 COMPREHEN METABOLIC PANEL: CPT

## 2021-04-30 PROCEDURE — 99285 EMERGENCY DEPT VISIT HI MDM: CPT

## 2021-04-30 PROCEDURE — 73700 CT LOWER EXTREMITY W/O DYE: CPT | Mod: 26,RT,MG

## 2021-04-30 PROCEDURE — 73590 X-RAY EXAM OF LOWER LEG: CPT | Mod: 26,RT

## 2021-04-30 PROCEDURE — 73610 X-RAY EXAM OF ANKLE: CPT

## 2021-04-30 PROCEDURE — 73610 X-RAY EXAM OF ANKLE: CPT | Mod: 26,RT

## 2021-04-30 PROCEDURE — 27810 TREATMENT OF ANKLE FRACTURE: CPT | Mod: RT

## 2021-04-30 PROCEDURE — 73700 CT LOWER EXTREMITY W/O DYE: CPT

## 2021-04-30 PROCEDURE — 96374 THER/PROPH/DIAG INJ IV PUSH: CPT | Mod: XU

## 2021-04-30 PROCEDURE — 73600 X-RAY EXAM OF ANKLE: CPT

## 2021-04-30 PROCEDURE — G1004: CPT

## 2021-04-30 PROCEDURE — A4570 SPLINT: CPT

## 2021-04-30 PROCEDURE — 99284 EMERGENCY DEPT VISIT MOD MDM: CPT

## 2021-04-30 PROCEDURE — 99285 EMERGENCY DEPT VISIT HI MDM: CPT | Mod: 25

## 2021-04-30 RX ORDER — IBUPROFEN 200 MG
1 TABLET ORAL
Qty: 30 | Refills: 0
Start: 2021-04-30 | End: 2021-05-29

## 2021-04-30 RX ORDER — HYDROMORPHONE HYDROCHLORIDE 2 MG/ML
0.5 INJECTION INTRAMUSCULAR; INTRAVENOUS; SUBCUTANEOUS ONCE
Refills: 0 | Status: DISCONTINUED | OUTPATIENT
Start: 2021-04-30 | End: 2021-04-30

## 2021-04-30 RX ORDER — MORPHINE SULFATE 50 MG/1
4 CAPSULE, EXTENDED RELEASE ORAL ONCE
Refills: 0 | Status: DISCONTINUED | OUTPATIENT
Start: 2021-04-30 | End: 2021-04-30

## 2021-04-30 RX ORDER — OXYCODONE HYDROCHLORIDE 5 MG/1
1 TABLET ORAL
Qty: 20 | Refills: 0
Start: 2021-04-30 | End: 2021-05-01

## 2021-04-30 RX ADMIN — HYDROMORPHONE HYDROCHLORIDE 0.5 MILLIGRAM(S): 2 INJECTION INTRAMUSCULAR; INTRAVENOUS; SUBCUTANEOUS at 18:35

## 2021-04-30 RX ADMIN — MORPHINE SULFATE 4 MILLIGRAM(S): 50 CAPSULE, EXTENDED RELEASE ORAL at 17:10

## 2021-04-30 RX ADMIN — MORPHINE SULFATE 4 MILLIGRAM(S): 50 CAPSULE, EXTENDED RELEASE ORAL at 18:34

## 2021-04-30 NOTE — ED PROVIDER NOTE - CLINICAL SUMMARY MEDICAL DECISION MAKING FREE TEXT BOX
patient with ankle fracture, seen by ortho, reduced fracture, nwb on R LE.  will dc w/follow up with ortho.

## 2021-04-30 NOTE — ED PROVIDER NOTE - NSFOLLOWUPINSTRUCTIONS_ED_ALL_ED_FT
Ankle Fracture    WHAT YOU NEED TO KNOW:    An ankle fracture is a break in 1 or more of the bones in your ankle.    Foot Anatomy         DISCHARGE INSTRUCTIONS:    Call your local emergency number (911 in the [place]) for any of the following:   •You feel lightheaded, short of breath, and have chest pain.      •You cough up blood.      Return to the emergency department if:   •Your leg feels warm, tender, and painful. It may look swollen and red.      •Blood soaks through your bandage.      •You have severe pain in your ankle.      •Your cast feels too tight.      •Your foot or toes are cold or numb.      •Your foot or toenails turn blue or gray.      Call your doctor if:   •Your splint feels too tight.      •Your swelling has increased or returned.      •You have a fever.      •Your pain does not go away, even after treatment.      •You have questions or concerns about your condition or care.      Medicines: You may need any of the following:   •Acetaminophen decreases pain and fever. It is available without a doctor's order. Ask how much to take and how often to take it. Follow directions. Read the labels of all other medicines you are using to see if they also contain acetaminophen, or ask your doctor or pharmacist. Acetaminophen can cause liver damage if not taken correctly. Do not use more than 4 grams (4,000 milligrams) total of acetaminophen in one day.       •NSAIDs, such as ibuprofen, help decrease swelling, pain, and fever. This medicine is available with or without a doctor's order. NSAIDs can cause stomach bleeding or kidney problems in certain people. If you take blood thinner medicine, always ask your healthcare provider if NSAIDs are safe for you. Always read the medicine label and follow directions.      •Prescription pain medicine may be given. Ask your healthcare provider how to take this medicine safely. Some prescription pain medicines contain acetaminophen. Do not take other medicines that contain acetaminophen without talking to your healthcare provider. Too much acetaminophen may cause liver damage. Prescription pain medicine may cause constipation. Ask your healthcare provider how to prevent or treat constipation.       •Take your medicine as directed. Contact your healthcare provider if you think your medicine is not helping or if you have side effects. Tell him or her if you are allergic to any medicine. Keep a list of the medicines, vitamins, and herbs you take. Include the amounts, and when and why you take them. Bring the list or the pill bottles to follow-up visits. Carry your medicine list with you in case of an emergency.      Follow up with your doctor in 1 to 2 days: Your fracture may need to be reduced (bones pushed back into place) or you may need surgery. Write down your questions so you remember to ask them during your visits.    Support devices: You will be given a brace, cast, or splint to limit your movement and protect your ankle. You may need to use crutches to protect your ankle and decrease your pain as you move around. Do not remove your device and do not put weight on your injured ankle.    Splint and cast care: Cover the splint or cast before you bathe so it does not get wet. Tape 2 plastic trash bags to your skin above the cast. Try to keep your ankle out of the water as much as possible.    Rest: Rest your ankle so that it can heal. Return to normal activities as directed.    Ice: Apply ice on your ankle for 15 to 20 minutes every hour or as directed. Use an ice pack, or put crushed ice in a plastic bag. Cover it with a towel. Ice helps prevent tissue damage and decreases swelling and pain.    Elevate: Elevate your ankle above the level of your heart as often as you can. This will help decrease swelling and pain. Prop your ankle on pillows or blankets to keep it elevated comfortably.

## 2021-04-30 NOTE — ED PROVIDER NOTE - PHYSICAL EXAMINATION
General:     NAD, well-nourished, well-appearing  Head:     NC/AT, EOMI  Neck:     trachea midline  Lungs:     CTA b/l, no w/r/r  CVS:     S1S2, RRR, no m/g/r  Abd:     +BS, s/nt/nd, no organomegaly  Ext:    2+ radial and pedal pulses,           R LE: ttp @ anterior medial ankle, with ecchymosies over medial mal.  ttp over lateral mal.  nt over posterior mal. nt/from @ hip/knee. nt over tibia.            L LE: from/nt @ hip/knee/ankle.   Neuro: grossly intact.

## 2021-04-30 NOTE — CONSULT NOTE ADULT - SUBJECTIVE AND OBJECTIVE BOX
Pt Name: SALAZAR GIORDANO    MRN: [id number]      Patient is a 55y Female presenting to the emergency department with a chief complaint of right ankle pain s/p trip off back stairs at house today. Patient and daughter provide info. Patient she missed  the last step and fell twisting her right ankle. She had immediate pain and came here to [facility] ER      HEALTH ISSUES - PROBLEM Dx:      .      REVIEW OF SYSTEMS      General: denies any other injuries or pain 	    Musculoskeletal:	 see HPI     ROS is otherwise negative.      PAST MEDICAL & SURGICAL HISTORY:  Asthma    Arthritis    History of  section        Allergies: No Known Allergies      Medications: HYDROmorphone  Injectable 0.5 milliGRAM(s) IV Push Once      FAMILY HISTORY:  Family history of lymphoma  Mother    : non-contributory    Social History:     Ambulation: Walking independently prior to injury                           12.6   13.01 )-----------( 250      ( 2021 17:39 )             38.2         143  |  105  |  13.0  ----------------------------<  106<H>  3.9   |  25.0  |  0.70    Ca    9.4      2021 17:39    TPro  7.7  /  Alb  4.3  /  TBili  0.3<L>  /  DBili  x   /  AST  26  /  ALT  17  /  AlkPhos  106        PHYSICAL EXAM:    Vital Signs Last 24 Hrs  T(C): 36.7 (2021 15:51), Max: 36.7 (2021 15:51)  T(F): 98 (2021 15:51), Max: 98 (2021 15:51)  HR: 109 (2021 15:51) (109 - 109)  BP: 115/76 (2021 15:51) (115/76 - 115/76)  BP(mean): --  RR: 18 (2021 15:51) (18 - 18)  SpO2: 97% (2021 15:51) (97% - 97%)  Daily Height in cm: 165.1 (2021 15:51)    Daily     Appearance: Alert, responsive, in no acute distress.  Neurological: Sensation is grossly intact to light touch. 5/5 motor function of all extremities. No focal deficits or weaknesses found.  Skin: no rash on visible skin. Skin is clean, dry and intact. No bleeding. No abrasions. No ulcerations.  Vascular: 2+ distal pulses. Cap refill < 2 sec. No signs of venous insuffiencey or stasis. No extremity ulcerations. No cyanosis.  Musculoskeletal:  Right Lower Extremity: Right ankle with moderate obvious deformity and swelling medially and laterally. Pulses appreciated.     Imaging Studies:   Right ankle xray shows comminuted fx medial and lateral malleoli with posterior subluxation of the talus and hindfoot    FRACTURE REDUCTION  PROCEDURE NOTE: Fracture reduction     Performed by:  Heather Pastrana PA-C and Letty Moore PA-C    Indication: Acute fracture with displacement, requiring fracture reduction.    Consent: The risks and benefits of the procedure including incomplete reduction, nerve damage and bleeding were explained and the patient verbalized their understanding and wished to proceed with the procedure. Written consent was obtained following the discussion.    Universal Protocol: a time out was performed and the correct patient and site were verified     Procedure: Neurovascular exam intact prior to fracture reduction.  Skin exam : No bleeding or lacerations at the fracture site. Anesthesia/pain control, with IV pain medication. Reduction of the right ankle was accomplished via axial traction and careful manipulation. Following adequate reduction and alignment of the fractured bone, the fracture was immobilized with a  plaster splint. Distally, the extremity was neurovascular intact following the procedure.  The patient tolerated the procedure well.    Post reduction films obtained and demonstrated an adequate reduction.    Complications: None    A/P:  Pt is a  55y Female with comminuted bimall fx w subluxation     PLAN:   *strict elevation right LE  * Splint care given  * NWB right LE  * Case discussed w Dr Cruz in 1-2 weeks.

## 2021-04-30 NOTE — ED ADULT TRIAGE NOTE - CHIEF COMPLAINT QUOTE
Pt states "I slipped down the step and my ankle hurts", pt with immobilization in place to right ankle, as per EMS "it was swollen but I don't think there's a deformity", palpable pulses present

## 2021-04-30 NOTE — ED PROVIDER NOTE - CARE PROVIDER_API CALL
Nithin Cruz)  Orthopaedic Surgery  [address] [place] [address]  Phone: [phone]  Fax: [phone]  Follow Up Time: 1-3 Days

## 2021-04-30 NOTE — ED PROVIDER NOTE - PATIENT PORTAL LINK FT
You can access the FollowMyHealth Patient Portal offered by [facility] by registering at the following website: [email or web address]. By joining [facility]’s FollowMyHealth portal, you will also be able to view your health information using other applications (apps) compatible with our system.

## 2021-04-30 NOTE — ED PROVIDER NOTE - NS ED ROS FT
Constitutional: (-) fever  (-)chills  (-)sweats  Eyes/ENT: (-)   Cardiovascular: (-) chest pain, (-) palpitations (-) edema   GI: no n/v.   Musculoskeletal: (-) neck pain, (-) back pain, (+) joint pain  Integumentary: (-) rash, (-) edema  Neurological: (-) headache, (-) altered mental status  (-)LOC

## 2021-04-30 NOTE — ED PROVIDER NOTE - OBJECTIVE STATEMENT
55yoF; with pmh signif for Asthma, Arthritis; now p/w right ankle pain--s/p trip and fall in backyard, twisting ankle, c/o pain.  denies numbness/tingling. denies focal weakness. denies head trauma. denies loc. denies n/v. unable to bear weight s/p fall.  denies cp/sob/palp/lh prior to fall.   PMH: asthma, arthritis  SOCIAL: No tobacco/illicit substance use/socialEtOH

## 2021-05-05 ENCOUNTER — APPOINTMENT (OUTPATIENT)
Dept: ORTHOPEDIC SURGERY | Facility: CLINIC | Age: 56
End: 2021-05-05
Payer: COMMERCIAL

## 2021-05-05 VITALS
SYSTOLIC BLOOD PRESSURE: 141 MMHG | TEMPERATURE: 98.1 F | HEIGHT: 62 IN | BODY MASS INDEX: 27.6 KG/M2 | WEIGHT: 150 LBS | DIASTOLIC BLOOD PRESSURE: 76 MMHG | HEART RATE: 124 BPM

## 2021-05-05 DIAGNOSIS — S82.891A OTHER FRACTURE OF RIGHT LOWER LEG, INITIAL ENCOUNTER FOR CLOSED FRACTURE: ICD-10-CM

## 2021-05-05 PROCEDURE — 99072 ADDL SUPL MATRL&STAF TM PHE: CPT

## 2021-05-05 PROCEDURE — 99203 OFFICE O/P NEW LOW 30 MIN: CPT

## 2021-05-05 PROCEDURE — 73610 X-RAY EXAM OF ANKLE: CPT | Mod: RT

## 2021-05-05 NOTE — DISCUSSION/SUMMARY
[de-identified] : 56-year-old female with right ankle fracture dislocation.  The fracture is well aligned at the present time.  We discussed that surgical intervention would be the best option for restoring the anatomic alignment of the ankle joint and decreasing the risk of post injury stiffness.  We discussed that nonoperative management is also a possibility with casting of the ankle but this would have longer periods of immobilization and nonweightbearing.\par \par It was explained to the patient that any surgical procedure carries with it the risks of loss of limb or loss of life. Medical complications include but are not limited to death or disability from a heart attack, stroke, GI bleeding, thrombophlebitis and pulmonary embolism, sepsis, adverse reactions including death due to blood transfusions, allergy or adverse drug reaction. There are other rare, unknown and uncommon systemic conditions that could also adversely affect the systemic outcome. Local complications include but are not limited to wound dehiscence, deep infection, failure of fixation or reconstruction, damage to nerves and vessels which could be temporary or permanent, as well as other rare, uncommon and unknown local complications that may necessitate re-operation, more complex orthopaedic reconstructions or amputation.\par \par The patient and her family feel they would like to proceed with surgery.  They were given the contact information for our surgery scheduler will work on plan to get date as soon as possible.\par \par The patient was given the opportunity to ask questions and all questions were answered to their satisfaction.\par \par Nithin Cruz MD\par Orthopaedic Trauma Surgeon\par [facility]\par [facility]\par Director Orthopaedic Trauma, [facility]\par \par \par \par

## 2021-05-05 NOTE — REASON FOR VISIT
[Initial Visit] : an initial visit for [Family Member] : family member [FreeTextEntry2] : Right ankle pain

## 2021-05-05 NOTE — PHYSICAL EXAM
[de-identified] : Physical Exam:\par General: Well appearing, no acute distress, A&O\par Neurologic: A&Ox3, No focal deficits\par Head: NCAT without abrasions, lacerations, or ecchymosis to head, face, or scalp\par Respiratory: Equal chest wall expansion bilaterally, no accessory muscle use\par Lymphatic: No lymphadenopathy palpated\par Skin: Warm and dry\par Psychiatric: Normal mood and affect\par \par RLE:\par Short leg splint in place.\par Sensation intact to light touch in exposed toes\par Brisk capillary refill\par Splint clean, dry, intact [de-identified] : Plain films of the right ankle were obtained today and compared to plain films and a CT scan performed in the hospital.  There is a trimalleolar ankle fracture with no loss of alignment from postreduction films

## 2021-05-05 NOTE — HISTORY OF PRESENT ILLNESS
[de-identified] : The patient is a pleasant 56-year-old female presents with her daughter and  today for evaluation of right ankle pain.  She suffered a fall several days ago and presented to [facility] emergency department.  Ankle fracture was diagnosed and a closed reduction was performed.  She follows up today for evaluation.  The patient states the pain is made worse with activity and relieved with rest.  Aching, 4 out of 10 [Bending] : worsened by bending [Lifting] : worsened by lifting [Weight Bearing] : worsened by weight bearing [Recumbency] : relieved by recumbency [Rest] : relieved by rest

## 2021-05-07 ENCOUNTER — OUTPATIENT (OUTPATIENT)
Dept: OUTPATIENT SERVICES | Facility: HOSPITAL | Age: 56
LOS: 1 days | End: 2021-05-07
Payer: COMMERCIAL

## 2021-05-07 VITALS
HEART RATE: 79 BPM | DIASTOLIC BLOOD PRESSURE: 72 MMHG | HEIGHT: 63 IN | WEIGHT: 147.71 LBS | SYSTOLIC BLOOD PRESSURE: 112 MMHG | TEMPERATURE: 98 F | RESPIRATION RATE: 20 BRPM

## 2021-05-07 DIAGNOSIS — Z01.818 ENCOUNTER FOR OTHER PREPROCEDURAL EXAMINATION: ICD-10-CM

## 2021-05-07 DIAGNOSIS — Z98.49 CATARACT EXTRACTION STATUS, UNSPECIFIED EYE: Chronic | ICD-10-CM

## 2021-05-07 DIAGNOSIS — S82.891A OTHER FRACTURE OF RIGHT LOWER LEG, INITIAL ENCOUNTER FOR CLOSED FRACTURE: ICD-10-CM

## 2021-05-07 DIAGNOSIS — Z98.891 HISTORY OF UTERINE SCAR FROM PREVIOUS SURGERY: Chronic | ICD-10-CM

## 2021-05-07 DIAGNOSIS — Z29.9 ENCOUNTER FOR PROPHYLACTIC MEASURES, UNSPECIFIED: ICD-10-CM

## 2021-05-07 LAB
ANION GAP SERPL CALC-SCNC: 13 MMOL/L — SIGNIFICANT CHANGE UP (ref 5–17)
APTT BLD: 31.4 SEC — SIGNIFICANT CHANGE UP (ref 27.5–35.5)
BASOPHILS # BLD AUTO: 0.08 K/UL — SIGNIFICANT CHANGE UP (ref 0–0.2)
BASOPHILS NFR BLD AUTO: 0.8 % — SIGNIFICANT CHANGE UP (ref 0–2)
BLD GP AB SCN SERPL QL: SIGNIFICANT CHANGE UP
BUN SERPL-MCNC: 15 MG/DL — SIGNIFICANT CHANGE UP (ref 8–20)
CALCIUM SERPL-MCNC: 9 MG/DL — SIGNIFICANT CHANGE UP (ref 8.6–10.2)
CHLORIDE SERPL-SCNC: 105 MMOL/L — SIGNIFICANT CHANGE UP (ref 98–107)
CO2 SERPL-SCNC: 24 MMOL/L — SIGNIFICANT CHANGE UP (ref 22–29)
CREAT SERPL-MCNC: 0.58 MG/DL — SIGNIFICANT CHANGE UP (ref 0.5–1.3)
EOSINOPHIL # BLD AUTO: 0.5 K/UL — SIGNIFICANT CHANGE UP (ref 0–0.5)
EOSINOPHIL NFR BLD AUTO: 5 % — SIGNIFICANT CHANGE UP (ref 0–6)
GLUCOSE SERPL-MCNC: 92 MG/DL — SIGNIFICANT CHANGE UP (ref 70–99)
HCT VFR BLD CALC: 36.2 % — SIGNIFICANT CHANGE UP (ref 34.5–45)
HGB BLD-MCNC: 11.6 G/DL — SIGNIFICANT CHANGE UP (ref 11.5–15.5)
IMM GRANULOCYTES NFR BLD AUTO: 0.2 % — SIGNIFICANT CHANGE UP (ref 0–1.5)
INR BLD: 1.13 RATIO — SIGNIFICANT CHANGE UP (ref 0.88–1.16)
LYMPHOCYTES # BLD AUTO: 2.65 K/UL — SIGNIFICANT CHANGE UP (ref 1–3.3)
LYMPHOCYTES # BLD AUTO: 26.6 % — SIGNIFICANT CHANGE UP (ref 13–44)
MCHC RBC-ENTMCNC: 30.1 PG — SIGNIFICANT CHANGE UP (ref 27–34)
MCHC RBC-ENTMCNC: 32 GM/DL — SIGNIFICANT CHANGE UP (ref 32–36)
MCV RBC AUTO: 93.8 FL — SIGNIFICANT CHANGE UP (ref 80–100)
MONOCYTES # BLD AUTO: 0.7 K/UL — SIGNIFICANT CHANGE UP (ref 0–0.9)
MONOCYTES NFR BLD AUTO: 7 % — SIGNIFICANT CHANGE UP (ref 2–14)
NEUTROPHILS # BLD AUTO: 6.02 K/UL — SIGNIFICANT CHANGE UP (ref 1.8–7.4)
NEUTROPHILS NFR BLD AUTO: 60.4 % — SIGNIFICANT CHANGE UP (ref 43–77)
PLATELET # BLD AUTO: 237 K/UL — SIGNIFICANT CHANGE UP (ref 150–400)
POTASSIUM SERPL-MCNC: 3.7 MMOL/L — SIGNIFICANT CHANGE UP (ref 3.5–5.3)
POTASSIUM SERPL-SCNC: 3.7 MMOL/L — SIGNIFICANT CHANGE UP (ref 3.5–5.3)
PROTHROM AB SERPL-ACNC: 13 SEC — SIGNIFICANT CHANGE UP (ref 10.6–13.6)
RBC # BLD: 3.86 M/UL — SIGNIFICANT CHANGE UP (ref 3.8–5.2)
RBC # FLD: 12.3 % — SIGNIFICANT CHANGE UP (ref 10.3–14.5)
SODIUM SERPL-SCNC: 142 MMOL/L — SIGNIFICANT CHANGE UP (ref 135–145)
WBC # BLD: 9.97 K/UL — SIGNIFICANT CHANGE UP (ref 3.8–10.5)
WBC # FLD AUTO: 9.97 K/UL — SIGNIFICANT CHANGE UP (ref 3.8–10.5)

## 2021-05-07 PROCEDURE — G0463: CPT

## 2021-05-07 PROCEDURE — 93010 ELECTROCARDIOGRAM REPORT: CPT

## 2021-05-07 PROCEDURE — 93005 ELECTROCARDIOGRAM TRACING: CPT

## 2021-05-07 RX ORDER — SODIUM CHLORIDE 0.65 %
2 AEROSOL, SPRAY (ML) NASAL
Qty: 0 | Refills: 0 | DISCHARGE

## 2021-05-07 RX ORDER — ALBUTEROL 90 UG/1
3 AEROSOL, METERED ORAL
Qty: 0 | Refills: 0 | DISCHARGE

## 2021-05-07 NOTE — H&P PST ADULT - RESPIRATORY
What Is The Reason For Today's Visit?: Skin Lesions Breath Sounds equal & clear to percussion & auscultation, no accessory muscle use

## 2021-05-07 NOTE — H&P PST ADULT - NSICDXPASTSURGICALHX_GEN_ALL_CORE_FT
PAST SURGICAL HISTORY:  History of  section      PAST SURGICAL HISTORY:  History of cataract surgery     History of  section

## 2021-05-07 NOTE — H&P PST ADULT - NSICDXPASTMEDICALHX_GEN_ALL_CORE_FT
PAST MEDICAL HISTORY:  Arthritis     Asthma     Other fracture of right lower leg, initial encounter for closed fracture      PAST MEDICAL HISTORY:  Asthma well controlled    Other fracture of right lower leg, initial encounter for closed fracture     RA (rheumatoid arthritis)     Seasonal allergies

## 2021-05-07 NOTE — H&P PST ADULT - NSICDXPROBLEM_GEN_ALL_CORE_FT
PROBLEM DIAGNOSES  Problem: Need for prophylactic measure  Assessment and Plan: Moderate risk surgical team to determine prophylactic intervention     Problem: Other fracture of right lower leg, initial encounter for closed fracture  Assessment and Plan: Open reduction internal fixation, right ankle

## 2021-05-07 NOTE — H&P PST ADULT - NSANTHOSAYNRD_GEN_A_CORE
No. SHIVAM screening performed.  STOP BANG Legend: 0-2 = LOW Risk; 3-4 = INTERMEDIATE Risk; 5-8 = HIGH Risk

## 2021-05-07 NOTE — H&P PST ADULT - MUSCULOSKELETAL COMMENTS
right lower extremity cast and ace wrap intact, pt able to wiggle her toes without any difficulty, toes skin color unremarkable.

## 2021-05-07 NOTE — H&P PST ADULT - HISTORY OF PRESENT ILLNESS
57 y/o female with RA, right ankle pain, Asthma, seen today pre-op for Open reduction internal fixation, right ankle for right ankle closed facture s/p  Patient accompanied by family me   She suffered a fall several days ago and presented to [facility] emergency department. Ankle fracture was diagnosed and a closed reduction was performed. She follows up today for evaluation. The patient states the pain is made worse with activity and relieved with rest. Aching, 4 out of 10   57 y/o female with RA, right ankle pain, Asthma, seen today pre-op for Open reduction internal fixation, right ankle for right ankle closed facture s/phe suffered a fall several days ago and presented to [facility] emergency department. Ankle fracture was diagnosed and a closed reduction was performed. She follows up today for evaluation. The patient states the pain is made worse with activity and relieved with rest. Aching, 4 out of 10   57 y/o female with RA, right ankle pain, Asthma( well controlled), seen today pre-op for Open reduction internal fixation, right ankle for right ankle closed facture s/p trip off back stairs at home and fell. Pt report she missed the last step and fell twisting the right ankle. Pt denies hitting her head. Pt was seen at [facility]  for right ankle pain,  fracture was diagnosed and a closed reduction was performed. The patient report pain 6/10 to 7/10 with activity, pt non weight bearing, uses crutches to assist with mobility. Seen today for a scheduled surgery on 5/14/21 with Dr. Cruz.           55 y/o female with RA, right ankle pain, Asthma( well controlled), seen today pre-op for Open reduction internal fixation, right ankle for right ankle closed facture s/p fell at home. Pt report she missed the last step on her stairs and fell twisting her right ankle, pt  denies hitting her head. Pt was seen at [facility] for right ankle pain, underwent Right lower extremity  X ray and CT LWR RT comminuted and displaced trimalleolar fracture was diagnosed and a closed reduction  and casting was performed. The patient report pain 6/10 to 7/10 with activity, pt non weight bearing, uses crutches to assist with mobility. Seen today for a scheduled surgery on 5/14/21 with Dr. Cruz.

## 2021-05-07 NOTE — H&P PST ADULT - ASSESSMENT
57 y/o female with RA, right ankle pain, Asthma( well controlled), seen today pre-op for Open reduction internal fixation, right ankle for right ankle closed facture s/p fell at home. Pt report she missed the last step on her stairs and fell twisting her right ankle, pt  denies hitting her head. Pt was seen at [facility] for right ankle pain, underwent Right lower extremity  X ray and CT LWR RT comminuted and displaced trimalleolar fracture was diagnosed and a closed reduction  and casting was performed. The patient report pain 6/10 to 7/10 with activity, pt non weight bearing, uses crutches to assist with mobility. Seen today for a scheduled surgery on 21 with Dr. Cruz. Surgery protocol reviewed with pt today.   CAPRINI VTE 2.0 SCORE [CLOT updated 2019]    AGE RELATED RISK FACTORS                                                       MOBILITY RELATED FACTORS  [x ] Age 41-60 years                                            (1 Point)                    [ ] Bed rest                                                        (1 Point)  [ ] Age: 61-74 years                                           (2 Points)                  [ ] Plaster cast                                                   (2 Points)  [ ] Age= 75 years                                              (3 Points)                    [ ] Bed bound for more than 72 hours                 (2 Points)    DISEASE RELATED RISK FACTORS                                               GENDER SPECIFIC FACTORS  [x ] Edema in the lower extremities                       (1 Point)              [ ] Pregnancy                                                     (1 Point)  [ ] Varicose veins                                               (1 Point)                     [ ] Post-partum < 6 weeks                                   (1 Point)             [ x] BMI > 25 Kg/m2                                            (1 Point)                     [ ] Hormonal therapy  or oral contraception          (1 Point)                 [ ] Sepsis (in the previous month)                        (1 Point)               [ ] History of pregnancy complications                 (1 point)  [ ] Pneumonia or serious lung disease                                               [ ] Unexplained or recurrent                     (1 Point)           (in the previous month)                               (1 Point)  [ ] Abnormal pulmonary function test                     (1 Point)                 SURGERY RELATED RISK FACTORS  [ ] Acute myocardial infarction                              (1 Point)               [ ]  Section                                             (1 Point)  [ ] Congestive heart failure (in the previous month)  (1 Point)      [ ] Minor surgery                                                  (1 Point)   [ ] Inflammatory bowel disease                             (1 Point)               [ ] Arthroscopic surgery                                        (2 Points)  [ ] Central venous access                                      (2 Points)                [ x] General surgery lasting more than 45 minutes (2 points)  [ ] Malignancy- Present or previous                   (2 Points)                [ ] Elective arthroplasty                                         (5 points)    [ ] Stroke (in the previous month)                          (5 Points)                                                                                                                                                           HEMATOLOGY RELATED FACTORS                                                 TRAUMA RELATED RISK FACTORS  [ ] Prior episodes of VTE                                     (3 Points)                [ ] Fracture of the hip, pelvis, or leg                       (5 Points)  [ ] Positive family history for VTE                         (3 Points)             [ ] Acute spinal cord injury (in the previous month)  (5 Points)  [ ] Prothrombin 25194 A                                     (3 Points)               [ ] Paralysis  (less than 1 month)                             (5 Points)  [ ] Factor V Leiden                                             (3 Points)                  [ ] Multiple Trauma within 1 month                        (5 Points)  [ ] Lupus anticoagulants                                     (3 Points)                                                           [ ] Anticardiolipin antibodies                               (3 Points)                                                       [ ] High homocysteine in the blood                      (3 Points)                                             [ ] Other congenital or acquired thrombophilia      (3 Points)                                                [ ] Heparin induced thrombocytopenia                  (3 Points)                                     Total Score [   5       ]  OPIOID RISK TOOL    TRISH EACH BOX THAT APPLIES AND ADD TOTALS AT THE END    FAMILY HISTORY OF SUBSTANCE ABUSE                 FEMALE         MALE                                                Alcohol                             [  ]1 pt          [  ]3pts                                               Illegal Durgs                     [  ]2 pts        [  ]3pts                                               Rx Drugs                           [  ]4 pts        [  ]4 pts    PERSONAL HISTORY OF SUBSTANCE ABUSE                                                                                          Alcohol                             [  ]3 pts       [  ]3 pts                                               Illegal Drugs                     [  ]4 pts        [  ]4 pts                                               Rx Drugs                           [  ]5 pts        [  ]5 pts    AGE BETWEEN 16-45 YEARS                                      [  ]1 pt         [  ]1 pt    HISTORY OF PREADOLESCENT   SEXUAL ABUSE                                                             [  ]3 pts        [  ]0pts    PSYCHOLOGICAL DISEASE                     ADD, OCD, Bipolar, Schizophrenia        [  ]2 pts         [  ]2 pts                      Depression                                               [  ]1 pt           [  ]1 pt           SCORING TOTAL   (add numbers and type here)              (0***)                                     A score of 3 or lower indicated LOW risk for future opioid abuse  A score of 4 to 7 indicated moderate risk for future opioid abuse  A score of 8 or higher indicates a high risk for opioid abuse

## 2021-05-10 DIAGNOSIS — Z01.818 ENCOUNTER FOR OTHER PREPROCEDURAL EXAMINATION: ICD-10-CM

## 2021-05-11 ENCOUNTER — APPOINTMENT (OUTPATIENT)
Dept: DISASTER EMERGENCY | Facility: CLINIC | Age: 56
End: 2021-05-11

## 2021-05-12 PROBLEM — J45.909 UNSPECIFIED ASTHMA, UNCOMPLICATED: Chronic | Status: ACTIVE | Noted: 2021-05-07

## 2021-05-12 PROBLEM — M06.9 RHEUMATOID ARTHRITIS, UNSPECIFIED: Chronic | Status: ACTIVE | Noted: 2021-05-07

## 2021-05-12 PROBLEM — J30.2 OTHER SEASONAL ALLERGIC RHINITIS: Chronic | Status: ACTIVE | Noted: 2021-05-07

## 2021-05-12 PROBLEM — S82.891A OTHER FRACTURE OF RIGHT LOWER LEG, INITIAL ENCOUNTER FOR CLOSED FRACTURE: Chronic | Status: ACTIVE | Noted: 2021-05-07

## 2021-05-12 LAB — SARS-COV-2 N GENE NPH QL NAA+PROBE: NOT DETECTED

## 2021-05-13 ENCOUNTER — TRANSCRIPTION ENCOUNTER (OUTPATIENT)
Age: 56
End: 2021-05-13

## 2021-05-14 ENCOUNTER — RESULT REVIEW (OUTPATIENT)
Age: 56
End: 2021-05-14

## 2021-05-14 ENCOUNTER — OUTPATIENT (OUTPATIENT)
Dept: INPATIENT UNIT | Facility: HOSPITAL | Age: 56
LOS: 1 days | End: 2021-05-14
Payer: COMMERCIAL

## 2021-05-14 ENCOUNTER — APPOINTMENT (OUTPATIENT)
Dept: ORTHOPEDIC SURGERY | Facility: HOSPITAL | Age: 56
End: 2021-05-14

## 2021-05-14 VITALS
SYSTOLIC BLOOD PRESSURE: 136 MMHG | HEIGHT: 63 IN | WEIGHT: 147.71 LBS | TEMPERATURE: 98 F | DIASTOLIC BLOOD PRESSURE: 65 MMHG | OXYGEN SATURATION: 98 % | HEART RATE: 88 BPM | RESPIRATION RATE: 18 BRPM

## 2021-05-14 VITALS
OXYGEN SATURATION: 100 % | RESPIRATION RATE: 15 BRPM | SYSTOLIC BLOOD PRESSURE: 107 MMHG | TEMPERATURE: 98 F | HEART RATE: 96 BPM | DIASTOLIC BLOOD PRESSURE: 80 MMHG

## 2021-05-14 DIAGNOSIS — S82.891A OTHER FRACTURE OF RIGHT LOWER LEG, INITIAL ENCOUNTER FOR CLOSED FRACTURE: ICD-10-CM

## 2021-05-14 DIAGNOSIS — Z98.49 CATARACT EXTRACTION STATUS, UNSPECIFIED EYE: Chronic | ICD-10-CM

## 2021-05-14 DIAGNOSIS — Z98.891 HISTORY OF UTERINE SCAR FROM PREVIOUS SURGERY: Chronic | ICD-10-CM

## 2021-05-14 PROCEDURE — 76000 FLUOROSCOPY <1 HR PHYS/QHP: CPT

## 2021-05-14 PROCEDURE — 73610 X-RAY EXAM OF ANKLE: CPT | Mod: 26,RT

## 2021-05-14 PROCEDURE — C1713: CPT

## 2021-05-14 PROCEDURE — 27822 TREATMENT OF ANKLE FRACTURE: CPT | Mod: RT

## 2021-05-14 PROCEDURE — 77071 MNL APPL STRS JT RADIOGRAPHY: CPT | Mod: RT

## 2021-05-14 PROCEDURE — 73610 X-RAY EXAM OF ANKLE: CPT

## 2021-05-14 RX ORDER — ALBUTEROL 90 UG/1
2 AEROSOL, METERED ORAL
Qty: 0 | Refills: 0 | DISCHARGE

## 2021-05-14 RX ORDER — CEFAZOLIN SODIUM 1 G
2000 VIAL (EA) INJECTION ONCE
Refills: 0 | Status: DISCONTINUED | OUTPATIENT
Start: 2021-05-14 | End: 2021-05-14

## 2021-05-14 RX ORDER — FLUTICASONE PROPIONATE AND SALMETEROL 50; 250 UG/1; UG/1
1 POWDER ORAL; RESPIRATORY (INHALATION)
Qty: 0 | Refills: 0 | DISCHARGE

## 2021-05-14 RX ORDER — SODIUM CHLORIDE 9 MG/ML
3 INJECTION INTRAMUSCULAR; INTRAVENOUS; SUBCUTANEOUS ONCE
Refills: 0 | Status: DISCONTINUED | OUTPATIENT
Start: 2021-05-14 | End: 2021-05-14

## 2021-05-14 RX ORDER — CETIRIZINE HYDROCHLORIDE 10 MG/1
1 TABLET ORAL
Qty: 0 | Refills: 0 | DISCHARGE

## 2021-05-14 RX ORDER — FENTANYL CITRATE 50 UG/ML
50 INJECTION INTRAVENOUS
Refills: 0 | Status: DISCONTINUED | OUTPATIENT
Start: 2021-05-14 | End: 2021-05-14

## 2021-05-14 RX ORDER — SULFASALAZINE 500 MG
2 TABLET ORAL
Qty: 0 | Refills: 0 | DISCHARGE

## 2021-05-14 RX ORDER — OXYCODONE HYDROCHLORIDE 5 MG/1
5 TABLET ORAL
Refills: 0 | Status: DISCONTINUED | OUTPATIENT
Start: 2021-05-14 | End: 2021-05-14

## 2021-05-14 RX ORDER — OXYCODONE HYDROCHLORIDE 5 MG/1
10 TABLET ORAL
Refills: 0 | Status: DISCONTINUED | OUTPATIENT
Start: 2021-05-14 | End: 2021-05-14

## 2021-05-14 RX ORDER — FENTANYL CITRATE 50 UG/ML
25 INJECTION INTRAVENOUS
Refills: 0 | Status: DISCONTINUED | OUTPATIENT
Start: 2021-05-14 | End: 2021-05-14

## 2021-05-14 RX ORDER — ASPIRIN/CALCIUM CARB/MAGNESIUM 324 MG
1 TABLET ORAL
Qty: 30 | Refills: 0
Start: 2021-05-14 | End: 2021-06-12

## 2021-05-14 RX ORDER — METOCLOPRAMIDE HCL 10 MG
10 TABLET ORAL ONCE
Refills: 0 | Status: DISCONTINUED | OUTPATIENT
Start: 2021-05-14 | End: 2021-05-14

## 2021-05-14 RX ORDER — ONDANSETRON 8 MG/1
4 TABLET, FILM COATED ORAL ONCE
Refills: 0 | Status: DISCONTINUED | OUTPATIENT
Start: 2021-05-14 | End: 2021-05-14

## 2021-05-14 RX ORDER — SODIUM CHLORIDE 9 MG/ML
1000 INJECTION, SOLUTION INTRAVENOUS
Refills: 0 | Status: DISCONTINUED | OUTPATIENT
Start: 2021-05-14 | End: 2021-05-14

## 2021-05-14 RX ORDER — OXYCODONE HYDROCHLORIDE 5 MG/1
1 TABLET ORAL
Qty: 30 | Refills: 0
Start: 2021-05-14 | End: 2021-05-18

## 2021-05-14 RX ORDER — FLUTICASONE PROPIONATE 220 MCG
1 AEROSOL WITH ADAPTER (GRAM) INHALATION
Qty: 0 | Refills: 0 | DISCHARGE

## 2021-05-14 RX ADMIN — OXYCODONE HYDROCHLORIDE 5 MILLIGRAM(S): 5 TABLET ORAL at 11:05

## 2021-05-14 RX ADMIN — OXYCODONE HYDROCHLORIDE 5 MILLIGRAM(S): 5 TABLET ORAL at 11:01

## 2021-05-14 RX ADMIN — FENTANYL CITRATE 50 MICROGRAM(S): 50 INJECTION INTRAVENOUS at 10:46

## 2021-05-14 RX ADMIN — FENTANYL CITRATE 50 MICROGRAM(S): 50 INJECTION INTRAVENOUS at 10:43

## 2021-05-14 NOTE — ASU DISCHARGE PLAN (ADULT/PEDIATRIC) - CARE PROVIDER_API CALL
Nithin Cruz)  Orthopaedic Surgery  [address] [place] [address]  Phone: [phone]  Fax: [phone]  Follow Up Time:

## 2021-05-14 NOTE — PHYSICAL THERAPY INITIAL EVALUATION ADULT - ADDITIONAL COMMENTS
Pt lives with family in a 2 story house with 3 steps to enter.  Independent with all prior to fx, without devices. For past week, has been NWB R SUDARSHAN with crutches.

## 2021-05-23 NOTE — DISCHARGE NOTE NURSING/CASE MANAGEMENT/SOCIAL WORK - PATIENT PORTAL LINK FT
You can access the FollowMyHealth Patient Portal offered by [facility] by registering at the following website: [email or web address]. By joining [facility]’s FollowMyHealth portal, you will also be able to view your health information using other applications (apps) compatible with our system.
No

## 2021-06-01 ENCOUNTER — APPOINTMENT (OUTPATIENT)
Dept: ORTHOPEDIC SURGERY | Facility: CLINIC | Age: 56
End: 2021-06-01
Payer: COMMERCIAL

## 2021-06-01 PROCEDURE — 99024 POSTOP FOLLOW-UP VISIT: CPT

## 2021-06-01 PROCEDURE — 73610 X-RAY EXAM OF ANKLE: CPT | Mod: RT

## 2021-06-01 PROCEDURE — 29540 STRAPPING ANKLE &/FOOT: CPT | Mod: 58,RT

## 2021-06-01 NOTE — HISTORY OF PRESENT ILLNESS
[2] : the patient reports pain that is 2/10 in severity [Chills] : no chills [Constipation] : no constipation [Diarrhea] : no diarrhea [Dysuria] : no dysuria [Fever] : no fever [Nausea] : no nausea [Vomiting] : no vomiting [Clean/Dry/Intact] : clean, dry and intact [Healed] : healed [Erythema] : not erythematous [Discharge] : absent of discharge [Swelling] : swollen [Neuro Intact] : an unremarkable neurological exam [Vascular Intact] : ~T peripheral vascular exam normal [Doing Well] : is doing well [Excellent Pain Control] : has excellent pain control [No Sign of Infection] : is showing no signs of infection [de-identified] : s/p open reduction and internal fixation of right trimalleolar ankle fracture. 5/14/21 [de-identified] : Plain films of the right ankle obtained today show stable fixation of a bimalleolar fracture [de-identified] : We will allow weightbearing as tolerated in her cam boot.  She may remove it for hygiene and for comfort.  Follow-up in 1 month with repeat x-rays and likely wean from the boot.

## 2021-06-11 ENCOUNTER — NON-APPOINTMENT (OUTPATIENT)
Age: 56
End: 2021-06-11

## 2021-06-22 ENCOUNTER — APPOINTMENT (OUTPATIENT)
Dept: ORTHOPEDIC SURGERY | Facility: CLINIC | Age: 56
End: 2021-06-22
Payer: COMMERCIAL

## 2021-06-22 PROCEDURE — 99024 POSTOP FOLLOW-UP VISIT: CPT

## 2021-06-22 NOTE — HISTORY OF PRESENT ILLNESS
[Neuro Intact] : an unremarkable neurological exam [Vascular Intact] : ~T peripheral vascular exam normal [de-identified] : s/p open reduction and internal fixation of right trimalleolar ankle fracture. 5/14/21 [de-identified] : 57 yo f s/p open reduction and internal fixation of right trimalleolar ankle fracture. 5/14/21 here for wound check.  Patient called yesterday with c/o wound opening.  She noticed opening in wound shortly after wearing the CAM boot.  She has been coming out of the boot when not ambulating.  No increase in pain, no fevers or chills [de-identified] : opening in the proximal and distal portions of the incision.  Minimal drainage. [de-identified] : no imaging today [de-identified] : s/p ORIF right ankle fracture with open areas of the incision.  [de-identified] : rx sent for Santyl daily.  Patient was instructed to see wound care.  Patient was given the contact information for Dr. ruggiero or they can see another wound care doctor.  Once they see wound care, they should follow their directions and prescriptions.  They can use Santyl until evaluated by wound care.  If they cannot get a soon appt with wound care, they should call office and we can try to assist with appt.

## 2021-07-07 ENCOUNTER — APPOINTMENT (OUTPATIENT)
Dept: ORTHOPEDIC SURGERY | Facility: CLINIC | Age: 56
End: 2021-07-07
Payer: COMMERCIAL

## 2021-07-07 PROCEDURE — 73610 X-RAY EXAM OF ANKLE: CPT | Mod: RT

## 2021-07-07 PROCEDURE — 99024 POSTOP FOLLOW-UP VISIT: CPT

## 2021-07-07 NOTE — HISTORY OF PRESENT ILLNESS
[Neuro Intact] : an unremarkable neurological exam [Vascular Intact] : ~T peripheral vascular exam normal [de-identified] : s/p open reduction and internal fixation of right trimalleolar ankle fracture. 5/14/21 [de-identified] : 55 yo f s/p open reduction and internal fixation of right trimalleolar ankle fracture. 5/14/21 here for wound check.  Patient was last seen 6/22/21 for wound check, and has since followed up with wound care specialist this past Friday. They gave her recommendations for local wound care and she will continue to follow with them this week. She says it is doing well and improving from last visit. She thinks the CAM boot is too tight on the outside of her ankle at the site of the problem. She has been coming out of the boot when not ambulating.  No increase in pain, no fevers or chills [de-identified] : opening in the proximal and distal portions of the incision.  Minimal drainage. +granulation tissue [de-identified] : x-rays obtained today of the right ankle demonstrate hardware in good position, fractures well healed [de-identified] : s/p ORIF right ankle fracture with open areas of the incision.  [de-identified] : Continue with local wound care recs per wound care team - Santyl daily.  Patient will continue to follow with wound care specialist. As far as her ankle fracture, we may discontinue use of the CAM boot as there is sufficient evidence of healing at this time. A new prescription for PT was provided today to work on graduated return to activities and weightbearing/strength exercises.  She may follow up on an as needed basis.

## 2021-08-04 ENCOUNTER — APPOINTMENT (OUTPATIENT)
Dept: RHEUMATOLOGY | Facility: CLINIC | Age: 56
End: 2021-08-04
Payer: COMMERCIAL

## 2021-08-04 VITALS
SYSTOLIC BLOOD PRESSURE: 120 MMHG | BODY MASS INDEX: 27.23 KG/M2 | HEIGHT: 62 IN | WEIGHT: 148 LBS | DIASTOLIC BLOOD PRESSURE: 78 MMHG

## 2021-08-04 DIAGNOSIS — M19.90 UNSPECIFIED OSTEOARTHRITIS, UNSPECIFIED SITE: ICD-10-CM

## 2021-08-04 DIAGNOSIS — Z79.899 OTHER LONG TERM (CURRENT) DRUG THERAPY: ICD-10-CM

## 2021-08-04 PROCEDURE — 99214 OFFICE O/P EST MOD 30 MIN: CPT

## 2021-08-04 RX ORDER — OXYCODONE 5 MG/1
5 TABLET ORAL EVERY 8 HOURS
Qty: 20 | Refills: 0 | Status: DISCONTINUED | COMMUNITY
Start: 2021-05-05 | End: 2021-08-04

## 2021-08-04 RX ORDER — OXYCODONE 5 MG/1
5 TABLET ORAL EVERY 6 HOURS
Qty: 20 | Refills: 0 | Status: DISCONTINUED | COMMUNITY
Start: 2021-05-24 | End: 2021-08-04

## 2021-08-04 RX ORDER — ERGOCALCIFEROL 1.25 MG/1
1.25 MG CAPSULE, LIQUID FILLED ORAL
Qty: 12 | Refills: 1 | Status: DISCONTINUED | COMMUNITY
Start: 2020-01-31 | End: 2021-08-04

## 2021-08-04 RX ORDER — SULFASALAZINE 500 MG/1
500 TABLET ORAL
Qty: 360 | Refills: 3 | Status: ACTIVE | COMMUNITY
Start: 2020-01-31 | End: 1900-01-01

## 2021-08-04 RX ORDER — COLLAGENASE SANTYL 250 [ARB'U]/G
250 OINTMENT TOPICAL DAILY
Qty: 1 | Refills: 0 | Status: DISCONTINUED | COMMUNITY
Start: 2021-06-22 | End: 2021-08-04

## 2021-08-04 NOTE — HISTORY OF PRESENT ILLNESS
[FreeTextEntry1] : Pt presenting today for a f.u visit. Doing well overall. Denies pain/swelling/stiffness to the joints. Currently on SSZ 1000mg BID. Tolerating medications well. Denies side effects. \par Denies fever, chills, CP, SOB, abd pain, rashes. \par Following ortho for Right trimalleolar ankle fracture. 5/14/21. s/p internal fixation. Currently doing PT.

## 2021-08-04 NOTE — ASSESSMENT
[FreeTextEntry1] : 56 year old female presenting today for a f.u visit. Has hx of RA/polyarthritis to BL hands. Previously following Dr. Ty. Treated with MTX- 10mg weekly x 3 years with improvement in symptoms.  \par \par Prior labs with elevated inflammatory markers, RF, CCP. +Quantiferon, +Hep Be AB, vitamin d: 17.\par Xray BL hands/ BL ankles/feet: unremarkable. baseline CXR: unremarkable\par \par Currently doing well on SSZ 1000mg BID\par \par - repeat labs\par - c/w SSZ 1000mg BID\par - Tylenol prn for pain\par - OTC topical analgesics\par - ID evaluation for latent TB. CXR: clear\par \par Discussed treatment plan with the patient. The patient was given the opportunity to ask questions and all questions were answered to their satisfaction.

## 2021-08-11 ENCOUNTER — APPOINTMENT (OUTPATIENT)
Dept: ORTHOPEDIC SURGERY | Facility: CLINIC | Age: 56
End: 2021-08-11
Payer: COMMERCIAL

## 2021-08-11 PROCEDURE — 99024 POSTOP FOLLOW-UP VISIT: CPT

## 2021-08-11 PROCEDURE — 73610 X-RAY EXAM OF ANKLE: CPT | Mod: RT

## 2021-08-11 NOTE — HISTORY OF PRESENT ILLNESS
[2] : the patient reports pain that is 2/10 in severity [Chills] : no chills [Constipation] : no constipation [Diarrhea] : no diarrhea [Dysuria] : no dysuria [Fever] : no fever [Nausea] : no nausea [Vomiting] : no vomiting [Healed] : healed [Erythema] : not erythematous [Discharge] : absent of discharge [Swelling] : swollen [Neuro Intact] : an unremarkable neurological exam [Vascular Intact] : ~T peripheral vascular exam normal [Doing Well] : is doing well [Excellent Pain Control] : has excellent pain control [No Sign of Infection] : is showing no signs of infection [None] : None [de-identified] : s/p open reduction and internal fixation of right trimalleolar ankle fracture. 5/14/21 [de-identified] : 56-year-old female s/p open reduction and internal fixation of right trimalleolar ankle fracture. 5/14/21 almost 3 months post injury.  She has been doing reasonably well.  Ambulating with one crutch.Going to wound care [de-identified] : 2 small areas of incomplete wound healing treated with Santyl [de-identified] : Plain films of the right ankle obtained today show stable fixation of a bimalleolar fracture [de-identified] : We will allow weightbearing as tolerated in regular shoes.  She should continue wound care per the wound care team.  Physical therapy to work on range of motion, weightbearing as tolerated, gait training.  As long as she continues to improve, she may follow-up as needed

## 2021-09-15 LAB
25(OH)D3 SERPL-MCNC: 17.7 NG/ML
ALBUMIN SERPL ELPH-MCNC: 4.5 G/DL
ALP BLD-CCNC: 93 U/L
ALT SERPL-CCNC: 13 U/L
ANION GAP SERPL CALC-SCNC: 14 MMOL/L
AST SERPL-CCNC: 16 U/L
BASOPHILS # BLD AUTO: 0.08 K/UL
BASOPHILS NFR BLD AUTO: 0.9 %
BILIRUB SERPL-MCNC: 0.2 MG/DL
BUN SERPL-MCNC: 12 MG/DL
CALCIUM SERPL-MCNC: 9.7 MG/DL
CCP AB SER IA-ACNC: >250 UNITS
CHLORIDE SERPL-SCNC: 103 MMOL/L
CK SERPL-CCNC: 142 U/L
CO2 SERPL-SCNC: 23 MMOL/L
CREAT SERPL-MCNC: 0.69 MG/DL
CRP SERPL-MCNC: <3 MG/L
EOSINOPHIL # BLD AUTO: 0.42 K/UL
EOSINOPHIL NFR BLD AUTO: 5 %
ERYTHROCYTE [SEDIMENTATION RATE] IN BLOOD BY WESTERGREN METHOD: 36 MM/HR
GLUCOSE SERPL-MCNC: 96 MG/DL
HCT VFR BLD CALC: 39.1 %
HGB BLD-MCNC: 12.5 G/DL
IMM GRANULOCYTES NFR BLD AUTO: 0.4 %
LYMPHOCYTES # BLD AUTO: 3.2 K/UL
LYMPHOCYTES NFR BLD AUTO: 37.9 %
MAN DIFF?: NORMAL
MCHC RBC-ENTMCNC: 30.9 PG
MCHC RBC-ENTMCNC: 32 GM/DL
MCV RBC AUTO: 96.5 FL
MONOCYTES # BLD AUTO: 0.64 K/UL
MONOCYTES NFR BLD AUTO: 7.6 %
NEUTROPHILS # BLD AUTO: 4.07 K/UL
NEUTROPHILS NFR BLD AUTO: 48.2 %
PLATELET # BLD AUTO: 260 K/UL
POTASSIUM SERPL-SCNC: 3.9 MMOL/L
PROT SERPL-MCNC: 7.6 G/DL
RBC # BLD: 4.05 M/UL
RBC # FLD: 13.1 %
RF+CCP IGG SER-IMP: ABNORMAL
RHEUMATOID FACT SER QL: >650 IU/ML
SODIUM SERPL-SCNC: 140 MMOL/L
TSH SERPL-ACNC: 3.4 UIU/ML
WBC # FLD AUTO: 8.44 K/UL

## 2021-11-03 ENCOUNTER — APPOINTMENT (OUTPATIENT)
Dept: RHEUMATOLOGY | Facility: CLINIC | Age: 56
End: 2021-11-03

## 2021-11-17 ENCOUNTER — APPOINTMENT (OUTPATIENT)
Dept: ORTHOPEDIC SURGERY | Facility: CLINIC | Age: 56
End: 2021-11-17
Payer: COMMERCIAL

## 2021-11-17 VITALS
BODY MASS INDEX: 27.23 KG/M2 | HEART RATE: 92 BPM | HEIGHT: 62 IN | WEIGHT: 148 LBS | TEMPERATURE: 97.1 F | SYSTOLIC BLOOD PRESSURE: 131 MMHG | DIASTOLIC BLOOD PRESSURE: 71 MMHG

## 2021-11-17 DIAGNOSIS — S82.891D OTHER FRACTURE OF RIGHT LOWER LEG, SUBSEQUENT ENCOUNTER FOR CLOSED FRACTURE WITH ROUTINE HEALING: ICD-10-CM

## 2021-11-17 PROCEDURE — 99214 OFFICE O/P EST MOD 30 MIN: CPT

## 2021-11-17 PROCEDURE — 73610 X-RAY EXAM OF ANKLE: CPT | Mod: RT

## 2021-11-17 NOTE — REASON FOR VISIT
[Follow-Up Visit] : a follow-up visit for [Family Member] : family member [FreeTextEntry2] : right ankle follow up

## 2021-11-17 NOTE — HISTORY OF PRESENT ILLNESS
[de-identified] : The patient is a pleasant 56-year-old female presents with her daughter and  today for follow up evaluation of right ankle pain.  She suffered a fall about 6 months ago and presented to [facility] emergency department.  She is now almost 6 months status post ORIF.  She has not yet returned to work.  She did have some issues with delayed wound healing was going to wound care.  It is fully healed now but she still has tenderness over the scar..  The patient states the pain is made worse with activity and relieved with rest.  Aching, 2 out of 10 [Bending] : worsened by bending [Lifting] : worsened by lifting [Weight Bearing] : worsened by weight bearing [Recumbency] : relieved by recumbency [Rest] : relieved by rest

## 2021-11-17 NOTE — PHYSICAL EXAM
[de-identified] : Physical Exam:\par General: Well appearing, no acute distress, A&O\par Neurologic: A&Ox3, No focal deficits\par Head: NCAT without abrasions, lacerations, or ecchymosis to head, face, or scalp\par Respiratory: Equal chest wall expansion bilaterally, no accessory muscle use\par Lymphatic: No lymphadenopathy palpated\par Skin: Warm and dry\par Psychiatric: Normal mood and affect\par \par RLE:\par SILT s/s/sp/dp/t\par Fires EHL/FHL/GS/TA\par 2+ DP/PT pulse\par brisk capillary refill\par Well-healed surgical incisions with moderately hypertrophic scars.\par Moderate tenderness to palpation over the scars\par Near full range of motion [de-identified] : Plain films of the right ankle were obtained today and compared to previous films.  The patient is status post ORIF.  The fractures are well-healed.

## 2021-11-17 NOTE — DISCUSSION/SUMMARY
[de-identified] : 56-year-old female with right ankle fracture, well-healed.  She does have some residual pain over the scar tissue which is not uncommon with injury such as this.  Additional physical therapy was ordered today.  She is a little apprehensive about returning to work as she has just recently been able to ambulate more normally.  Through this, she feels that she is unable to return to full duty work, unless light duty is available, I would still consider her temporarily 100% disabled.  She wants to be out until around January which I feel is appropriate.  Anything longer than that I would refer her for a functional capacity evaluation and long-term disability evaluation.\par \par No orthopedic trauma intervention is required but we will help facilitate future care as needed. The patient may follow up as needed.\par \par The patient was given the opportunity to ask questions and all questions were answered to their satisfaction.\par \par Nithin Cruz MD\par Orthopaedic Trauma Surgeon\par [facility]\par [facility] [place]\par Director Orthopaedic Trauma, [facility]\par \par \par \par

## 2022-08-16 NOTE — H&P PST ADULT - ALCOHOL USE HISTORY SINGLE SELECT
-- DO NOT REPLY / DO NOT REPLY ALL --  -- Message is from [facility] [employer] ([facility]) --    Referral Request  Name of Specialist: Maye Alvarado  Provider's specialty: Obstetrics / Gynecology    Medical condition for referral:  Pap smear for cervical cancer screening / Patient received a referral to see Dr. Alvarado last year, but did not schedule.  She is requesting an updated referral please.    Is this a NEW request?:       Referral ordered by: Salas Dempsey      Insurance type:       Payor: [facility] MEDICAID / Plan: [facility] MEDICAID HMO / Product Type: T19 HMO      Preferred Delivery Method   Call when ready for pickup - phone number to notify: [phone]    Caller Information       Type Contact Phone/Fax    2022 12:07 PM CDT Phone (Incoming) Shadia Castro (Self) [phone] (M)          Alternative phone number: none    Can a detailed message be left? Yes    Please give this turnaround time to the caller:   \"This message will be sent to [state Provider's full name]. The clinical team will return your call as soon as they review your message. Typically, it takes 3 business days to process referral requests.\"   never

## 2023-02-27 ENCOUNTER — OFFICE (OUTPATIENT)
Dept: URBAN - METROPOLITAN AREA CLINIC 112 | Facility: CLINIC | Age: 58
Setting detail: OPHTHALMOLOGY
End: 2023-02-27
Payer: COMMERCIAL

## 2023-02-27 DIAGNOSIS — H43.393: ICD-10-CM

## 2023-02-27 DIAGNOSIS — H04.123: ICD-10-CM

## 2023-02-27 PROBLEM — H02.423 PTOSIS MYOGENIC; BOTH EYES: Status: ACTIVE | Noted: 2023-02-27

## 2023-02-27 PROCEDURE — 92250 FUNDUS PHOTOGRAPHY W/I&R: CPT | Performed by: OPHTHALMOLOGY

## 2023-02-27 PROCEDURE — 92014 COMPRE OPH EXAM EST PT 1/>: CPT | Performed by: OPHTHALMOLOGY

## 2023-02-27 ASSESSMENT — SUPERFICIAL PUNCTATE KERATITIS (SPK)
OD_SPK: 1+ 2+
OS_SPK: 1+ 2+

## 2023-02-27 ASSESSMENT — REFRACTION_MANIFEST
OD_VA1: 20/20
OS_AXIS: 100
OD_CYLINDER: -0.50
OD_AXIS: 021
OS_VA1: 20/20
OD_SPHERE: +0.50
OS_CYLINDER: -0.25
OS_SPHERE: +0.50

## 2023-02-27 ASSESSMENT — REFRACTION_AUTOREFRACTION
OS_SPHERE: -0.50
OS_CYLINDER: -0.25
OD_CYLINDER: -0.50
OD_AXIS: 027
OS_AXIS: 107
OD_SPHERE: -0.50

## 2023-02-27 ASSESSMENT — VISUAL ACUITY
OD_BCVA: 20/20
OS_BCVA: 20/20

## 2023-02-27 ASSESSMENT — SPHEQUIV_DERIVED
OD_SPHEQUIV: 0.25
OS_SPHEQUIV: 0.375
OS_SPHEQUIV: -0.625
OD_SPHEQUIV: -0.75

## 2023-02-27 ASSESSMENT — KERATOMETRY
OD_K2POWER_DIOPTERS: 45.25
OD_AXISANGLE_DEGREES: 111
OS_K2POWER_DIOPTERS: 45.00
METHOD_AUTO_MANUAL: AUTO
OS_AXISANGLE_DEGREES: 036
OD_K1POWER_DIOPTERS: 44.50
OS_K1POWER_DIOPTERS: 44.50

## 2023-02-27 ASSESSMENT — AXIALLENGTH_DERIVED
OS_AL: 23.0018
OD_AL: 23.0045
OD_AL: 23.381
OS_AL: 23.3782

## 2023-02-27 ASSESSMENT — LID EXAM ASSESSMENTS
OD_MEIBOMITIS: 2+
OS_MEIBOMITIS: 2+

## 2023-02-27 ASSESSMENT — CONFRONTATIONAL VISUAL FIELD TEST (CVF)
OD_FINDINGS: FULL
OS_FINDINGS: FULL

## 2024-09-18 NOTE — DISCHARGE NOTE NURSING/CASE MANAGEMENT/SOCIAL WORK - NSTRANSFERBELONGINGSRESP_GEN_A_NUR
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 The patient presented with disease involving the mental skin. Dr. Harrington did a fibula and anterolateral thigh free tissue reconstruction. The patient did have some hardware exposed prior to radiation and then completed her chemoradiation therapy with Dr. Egan on December 5, 2023.       Today's encounter was conducted via video visit but the patient's camera was not working well so it was effectively a phone visit.  She states she is doing well currently she continues to have significant exposed necrotic bone and plate but is not interested in do anything about this because it would require a large surgery.  She does not have any back pain or pain in her mouth and in general says that she is doing quite well.  She says she had a great summer and otherwise has no complaints.    I suggested she check in with me in the next 3 to 6 months.    Tyshawn Dao M.D.       yes

## 2024-12-02 ENCOUNTER — OFFICE (OUTPATIENT)
Dept: URBAN - METROPOLITAN AREA CLINIC 112 | Facility: CLINIC | Age: 59
Setting detail: OPHTHALMOLOGY
End: 2024-12-02
Payer: COMMERCIAL

## 2024-12-02 DIAGNOSIS — H04.123: ICD-10-CM

## 2024-12-02 DIAGNOSIS — H43.393: ICD-10-CM

## 2024-12-02 DIAGNOSIS — Z96.1: ICD-10-CM

## 2024-12-02 DIAGNOSIS — H04.121: ICD-10-CM

## 2024-12-02 DIAGNOSIS — H04.122: ICD-10-CM

## 2024-12-02 DIAGNOSIS — H02.423: ICD-10-CM

## 2024-12-02 PROCEDURE — 92250 FUNDUS PHOTOGRAPHY W/I&R: CPT | Performed by: OPHTHALMOLOGY

## 2024-12-02 PROCEDURE — 92014 COMPRE OPH EXAM EST PT 1/>: CPT | Performed by: OPHTHALMOLOGY

## 2024-12-02 ASSESSMENT — SUPERFICIAL PUNCTATE KERATITIS (SPK)
OD_SPK: 1+ 2+
OS_SPK: 1+ 2+

## 2024-12-02 ASSESSMENT — VISUAL ACUITY
OS_BCVA: 20/25-1
OD_BCVA: 20/25+1

## 2024-12-02 ASSESSMENT — KERATOMETRY
OD_K1POWER_DIOPTERS: 44.50
OD_K2POWER_DIOPTERS: 45.50
OS_AXISANGLE_DEGREES: 044
OS_K1POWER_DIOPTERS: 4.75
METHOD_AUTO_MANUAL: AUTO
OD_AXISANGLE_DEGREES: 104
OS_K2POWER_DIOPTERS: 45.00

## 2024-12-02 ASSESSMENT — REFRACTION_AUTOREFRACTION
OS_CYLINDER: -0.50
OD_SPHERE: -0.75
OD_AXIS: 025
OS_SPHERE: -0.75
OD_CYLINDER: -0.50
OS_AXIS: 113

## 2024-12-02 ASSESSMENT — TONOMETRY
OS_IOP_MMHG: 15
OD_IOP_MMHG: 14

## 2024-12-02 ASSESSMENT — CONFRONTATIONAL VISUAL FIELD TEST (CVF)
OS_FINDINGS: FULL
OD_FINDINGS: FULL

## 2024-12-02 ASSESSMENT — REFRACTION_MANIFEST
OD_SPHERE: +0.50
OS_CYLINDER: -0.25
OD_AXIS: 021
OS_SPHERE: +0.50
OS_AXIS: 100
OD_CYLINDER: -0.50
OS_VA1: 20/20
OD_VA1: 20/20

## 2024-12-02 ASSESSMENT — LID EXAM ASSESSMENTS
OD_MEIBOMITIS: 2+
OS_MEIBOMITIS: 2+